# Patient Record
Sex: FEMALE | Race: WHITE | NOT HISPANIC OR LATINO | Employment: FULL TIME | URBAN - METROPOLITAN AREA
[De-identification: names, ages, dates, MRNs, and addresses within clinical notes are randomized per-mention and may not be internally consistent; named-entity substitution may affect disease eponyms.]

---

## 2017-01-20 ENCOUNTER — LAB VISIT (OUTPATIENT)
Dept: LAB | Facility: OTHER | Age: 32
End: 2017-01-20
Attending: OBSTETRICS & GYNECOLOGY
Payer: COMMERCIAL

## 2017-01-20 DIAGNOSIS — N91.5 OLIGOMENORRHEA: ICD-10-CM

## 2017-01-20 DIAGNOSIS — N97.0 PRIMARY ANOVULATORY INFERTILITY: ICD-10-CM

## 2017-01-20 LAB — PROGEST SERPL-MCNC: 0.5 NG/ML

## 2017-01-20 PROCEDURE — 84144 ASSAY OF PROGESTERONE: CPT

## 2017-01-20 PROCEDURE — 36415 COLL VENOUS BLD VENIPUNCTURE: CPT

## 2017-01-24 ENCOUNTER — TELEPHONE (OUTPATIENT)
Dept: OBSTETRICS AND GYNECOLOGY | Facility: CLINIC | Age: 32
End: 2017-01-24

## 2017-01-24 RX ORDER — CLOMIPHENE CITRATE 50 MG/1
100 TABLET ORAL DAILY
Qty: 10 TABLET | Refills: 0 | Status: SHIPPED | OUTPATIENT
Start: 2017-01-24 | End: 2017-02-14 | Stop reason: SDUPTHER

## 2017-01-24 NOTE — TELEPHONE ENCOUNTER
Left message for patient that blood work indicates she did not ovulate this month. Will increase clomid dose to 100 mg daily. New Rx sent in.

## 2017-01-25 ENCOUNTER — TELEPHONE (OUTPATIENT)
Dept: OBSTETRICS AND GYNECOLOGY | Facility: CLINIC | Age: 32
End: 2017-01-25

## 2017-01-25 NOTE — TELEPHONE ENCOUNTER
Dr. Mora patient calling about the monthly lab work she gets, can it be coded differently for insurance?please call patient back at  [phone]

## 2017-01-30 NOTE — TELEPHONE ENCOUNTER
Spoke with patient who was confused about timing of Provera and Clomid. Did not take Provera this month, just started Clomid. Instructed to let me know if no menses in 3 weeks, and if UPT negative at that point we will re-start cycle with Provera followed by Clomid.   Patient also had questions about coding of dx associated with Clomid. All questions answered and patient agrees to treatment plan.

## 2017-02-09 NOTE — TELEPHONE ENCOUNTER
Pt states she took the Clomid without getting her period. (1/26).  She started her period without the help of provera which is unlike her.  (advised she probably started her period because the clomid made her ovulate?)  Today is day 3 of her cycle.   She is asking for a rx of Provera too.      Is it too early for her to take Clomid again 2 weeks later?      Med order entered for Provera but NOT Clomid.     Allergies and pharmacy UTD

## 2017-02-09 NOTE — TELEPHONE ENCOUNTER
Dr Mora pt calling, pt is on clomid and started it to soon and was told to wait to see if her period comes.Pt states she has started her period should see refill the medication or just wait until next month. [phone]

## 2017-02-10 RX ORDER — MEDROXYPROGESTERONE ACETATE 10 MG/1
10 TABLET ORAL DAILY
Qty: 10 TABLET | Refills: 0 | Status: SHIPPED | OUTPATIENT
Start: 2017-02-10 | End: 2017-03-31 | Stop reason: SDUPTHER

## 2017-02-10 NOTE — TELEPHONE ENCOUNTER
Spoke with patient. Advised to go ahead with clomid treatment this cycle, so 2/7 should be considered day 1. That makes tomorrow (2/11) day 5, and patient will start Clomid then.

## 2017-02-10 NOTE — TELEPHONE ENCOUNTER
You sent this back to me. Was that by mistake. Did you call the patient or does Lindy need to call her back

## 2017-02-15 RX ORDER — CLOMIPHENE CITRATE 50 MG/1
100 TABLET ORAL DAILY
Qty: 10 TABLET | Refills: 0 | Status: SHIPPED | OUTPATIENT
Start: 2017-02-15 | End: 2017-02-20

## 2017-03-29 ENCOUNTER — LAB VISIT (OUTPATIENT)
Dept: LAB | Facility: OTHER | Age: 32
End: 2017-03-29
Attending: OBSTETRICS & GYNECOLOGY
Payer: COMMERCIAL

## 2017-03-29 DIAGNOSIS — N83.9 DISORDER OF OVULATION: Primary | ICD-10-CM

## 2017-03-29 DIAGNOSIS — N83.9 DISORDER OF OVULATION: ICD-10-CM

## 2017-03-29 PROCEDURE — 36415 COLL VENOUS BLD VENIPUNCTURE: CPT

## 2017-03-29 PROCEDURE — 84144 ASSAY OF PROGESTERONE: CPT

## 2017-03-30 LAB — PROGEST SERPL-MCNC: 9.4 NG/ML

## 2017-03-31 ENCOUNTER — TELEPHONE (OUTPATIENT)
Dept: OBSTETRICS AND GYNECOLOGY | Facility: CLINIC | Age: 32
End: 2017-03-31

## 2017-03-31 DIAGNOSIS — N91.5 OLIGOMENORRHEA: Primary | ICD-10-CM

## 2017-03-31 RX ORDER — MEDROXYPROGESTERONE ACETATE 10 MG/1
10 TABLET ORAL DAILY
Qty: 10 TABLET | Refills: 0 | Status: SHIPPED | OUTPATIENT
Start: 2017-03-31 | End: 2017-09-22

## 2017-03-31 RX ORDER — CLOMIPHENE CITRATE 50 MG/1
100 TABLET ORAL DAILY
Qty: 10 TABLET | Refills: 0 | Status: SHIPPED | OUTPATIENT
Start: 2017-03-31 | End: 2017-04-05

## 2017-03-31 NOTE — TELEPHONE ENCOUNTER
Discussed day 21 progesterone level indicating ovulation. Patient instructed to take UPT on day 30 if no menses. If no pregnancy this month will repeat cycle of provera and clomid. Refills sent in and day 21 progesterone ordered for next month if needed. If no pregnancy after that cycle patient instructed to make an appointment to discuss further options.

## 2017-04-26 ENCOUNTER — LAB VISIT (OUTPATIENT)
Dept: LAB | Facility: OTHER | Age: 32
End: 2017-04-26
Attending: OBSTETRICS & GYNECOLOGY
Payer: COMMERCIAL

## 2017-04-26 DIAGNOSIS — N91.5 OLIGOMENORRHEA: ICD-10-CM

## 2017-04-26 LAB — PROGEST SERPL-MCNC: 17.6 NG/ML

## 2017-04-26 PROCEDURE — 84144 ASSAY OF PROGESTERONE: CPT

## 2017-04-26 PROCEDURE — 36415 COLL VENOUS BLD VENIPUNCTURE: CPT

## 2017-04-28 ENCOUNTER — TELEPHONE (OUTPATIENT)
Dept: OBSTETRICS AND GYNECOLOGY | Facility: HOSPITAL | Age: 32
End: 2017-04-28

## 2017-04-28 NOTE — TELEPHONE ENCOUNTER
Called patient and reviewed that progesterone levels indicate ovulation. Instructed to take UPT on cycle day 30 if no period. If UPT negative will need to do further infertility work up with [facility] and [facility]. Patient instructed to call the office to discuss if UPT negative.

## 2017-05-08 ENCOUNTER — TELEPHONE (OUTPATIENT)
Dept: OBSTETRICS AND GYNECOLOGY | Facility: CLINIC | Age: 32
End: 2017-05-08

## 2017-05-08 NOTE — TELEPHONE ENCOUNTER
Dr. Mora's pt calling, Pt would like to speak to the nurse. Pt states that she took her 3rd month of ovulation medication. Pt states that she was told to call if she did not get pregnant after the 3rd month so that they can discuss other options. Pt's # [phone]

## 2017-05-08 NOTE — TELEPHONE ENCOUNTER
Pt did ovulate when clomid (100mg) according to her blood work but she did not get pregnant.  This is the 3rd month.  You wanted her to call if she was not pregnant this month to discuss other options.  She was asking if she needed to make an appt?

## 2017-05-09 NOTE — TELEPHONE ENCOUNTER
MD Lindy Prather RN        Caller: Unspecified (Yesterday, 10:04 AM)                     Mable has now been trying to get pregnant for almost a year without success. I would recommend at this point that she go see a Reproductive Endocrinologist and Infertility specialist because I know she and her  are really eager to conceive. Please let her know that I highly recommend Dr. Judith Harvey at [facility] [phone] or Dr. Yomi Banegas at the [facility] in [place] [phone].   I am more than happy to answer any questions she might have, so let me know if she wants me to give her a call!   Thank you

## 2017-06-13 LAB
ABO + RH BLD: NORMAL
C TRACH RRNA SPEC QL PROBE: NEGATIVE
CYSTIC FIBROSIS MUTATION PNL: NEGATIVE
HBV SURFACE AG SERPL QL IA: NEGATIVE
HIV 1+2 AB+HIV1 P24 AG SERPL QL IA: NEGATIVE
HIV-1 AND HIV-2 ANTIBODIES: NEGATIVE
INDIRECT COOMBS: NORMAL
N GONORRHOEAE, AMPLIFIED DNA: NEGATIVE
RPR: NORMAL
RUBELLA IMMUNE STATUS: NORMAL

## 2017-09-01 LAB — EXT MATERNIT21: NORMAL

## 2017-09-06 ENCOUNTER — TELEPHONE (OUTPATIENT)
Dept: OBSTETRICS AND GYNECOLOGY | Facility: CLINIC | Age: 32
End: 2017-09-06

## 2017-09-06 DIAGNOSIS — N91.2 AMENORRHEA: Primary | ICD-10-CM

## 2017-09-06 NOTE — TELEPHONE ENCOUNTER
Dr Mora's has initial ob appt 9-22-17 from fertility needs orders put into computer and linked.Thanks

## 2017-09-22 ENCOUNTER — PROCEDURE VISIT (OUTPATIENT)
Dept: OBSTETRICS AND GYNECOLOGY | Facility: CLINIC | Age: 32
End: 2017-09-22
Payer: COMMERCIAL

## 2017-09-22 ENCOUNTER — INITIAL PRENATAL (OUTPATIENT)
Dept: OBSTETRICS AND GYNECOLOGY | Facility: CLINIC | Age: 32
End: 2017-09-22
Payer: COMMERCIAL

## 2017-09-22 VITALS
BODY MASS INDEX: 24.51 KG/M2 | WEIGHT: 147.25 LBS | DIASTOLIC BLOOD PRESSURE: 64 MMHG | SYSTOLIC BLOOD PRESSURE: 118 MMHG

## 2017-09-22 DIAGNOSIS — Z3A.12 12 WEEKS GESTATION OF PREGNANCY: Primary | ICD-10-CM

## 2017-09-22 DIAGNOSIS — Z34.92 NORMAL PREGNANCY, SECOND TRIMESTER: ICD-10-CM

## 2017-09-22 DIAGNOSIS — N91.2 AMENORRHEA: ICD-10-CM

## 2017-09-22 PROCEDURE — 99999 PR PBB SHADOW E&M-EST. PATIENT-LVL II: CPT | Mod: PBBFAC,,, | Performed by: OBSTETRICS & GYNECOLOGY

## 2017-09-22 PROCEDURE — 0500F INITIAL PRENATAL CARE VISIT: CPT | Mod: S$GLB,,, | Performed by: OBSTETRICS & GYNECOLOGY

## 2017-09-22 PROCEDURE — 76801 OB US < 14 WKS SINGLE FETUS: CPT | Mod: S$GLB,,, | Performed by: OBSTETRICS & GYNECOLOGY

## 2017-10-16 NOTE — PROGRESS NOTES
15w6d without major complaints.   Patient sent to flu fair today for flu shot.   GC/CT collected today.  Anatomy scan ordered today.   RTC in 4 weeks for routine PNC.

## 2017-10-17 ENCOUNTER — IMMUNIZATION (OUTPATIENT)
Dept: OBSTETRICS AND GYNECOLOGY | Facility: CLINIC | Age: 32
End: 2017-10-17
Payer: COMMERCIAL

## 2017-10-17 ENCOUNTER — ROUTINE PRENATAL (OUTPATIENT)
Dept: OBSTETRICS AND GYNECOLOGY | Facility: CLINIC | Age: 32
End: 2017-10-17
Payer: COMMERCIAL

## 2017-10-17 VITALS
DIASTOLIC BLOOD PRESSURE: 74 MMHG | WEIGHT: 153.69 LBS | SYSTOLIC BLOOD PRESSURE: 106 MMHG | BODY MASS INDEX: 25.57 KG/M2

## 2017-10-17 DIAGNOSIS — Z34.92 NORMAL PREGNANCY, SECOND TRIMESTER: Primary | ICD-10-CM

## 2017-10-17 DIAGNOSIS — Z3A.15 15 WEEKS GESTATION OF PREGNANCY: ICD-10-CM

## 2017-10-17 PROCEDURE — 90686 IIV4 VACC NO PRSV 0.5 ML IM: CPT | Mod: S$GLB,,, | Performed by: OBSTETRICS & GYNECOLOGY

## 2017-10-17 PROCEDURE — 90471 IMMUNIZATION ADMIN: CPT | Mod: S$GLB,,, | Performed by: OBSTETRICS & GYNECOLOGY

## 2017-10-17 PROCEDURE — 87591 N.GONORRHOEAE DNA AMP PROB: CPT

## 2017-10-17 PROCEDURE — 0502F SUBSEQUENT PRENATAL CARE: CPT | Mod: S$GLB,,, | Performed by: OBSTETRICS & GYNECOLOGY

## 2017-10-17 PROCEDURE — 99999 PR PBB SHADOW E&M-EST. PATIENT-LVL II: CPT | Mod: PBBFAC,,, | Performed by: OBSTETRICS & GYNECOLOGY

## 2017-10-18 LAB
C TRACH DNA SPEC QL NAA+PROBE: NOT DETECTED
N GONORRHOEA DNA SPEC QL NAA+PROBE: NOT DETECTED

## 2017-11-08 ENCOUNTER — OFFICE VISIT (OUTPATIENT)
Dept: MATERNAL FETAL MEDICINE | Facility: CLINIC | Age: 32
End: 2017-11-08
Payer: COMMERCIAL

## 2017-11-08 DIAGNOSIS — Z36.89 ENCOUNTER FOR FETAL ANATOMIC SURVEY: ICD-10-CM

## 2017-11-08 DIAGNOSIS — Z34.92 NORMAL PREGNANCY, SECOND TRIMESTER: ICD-10-CM

## 2017-11-08 PROCEDURE — 76805 OB US >/= 14 WKS SNGL FETUS: CPT | Mod: S$GLB,,, | Performed by: OBSTETRICS & GYNECOLOGY

## 2017-11-08 PROCEDURE — 76817 TRANSVAGINAL US OBSTETRIC: CPT | Mod: S$GLB,,, | Performed by: OBSTETRICS & GYNECOLOGY

## 2017-11-08 PROCEDURE — 99499 UNLISTED E&M SERVICE: CPT | Mod: S$GLB,,, | Performed by: OBSTETRICS & GYNECOLOGY

## 2017-11-08 NOTE — PROGRESS NOTES
"OB Ultrasound Report (see PDF version under imaging tab)    Indication  ========    Fetal anatomy survey.    History  ======    General History  Other: Mat21 negative  Previous Outcomes   1    Method  ======    Transabdominal and transvaginal ultrasound examination.    Pregnancy  =========    Osborn pregnancy. Number of fetuses: 1.    Dating  ======    Cycle: regular cycle  GA by "stated dating" 19 w + 0 d  EMILIANO by "stated dating": 2018  Ultrasound examination on: 2017  GA by U/S based upon: AC, BPD, Femur, HC  GA by U/S 19 w + 2 d  EMILIANO by U/S: 2018  Assigned: Dating performed on 2017, based on the external assessment  Assigned GA 19 w + 0 d  Assigned EMILIANO: 2018    General Evaluation  ===============    Cardiac activity: present.  bpm.  Fetal movements: visualized.  Placenta:  Placental site: anterior, possibly low lying.  Umbilical cord: Cord vessels: 3 vessel cord. Cord insertion: placental insertion: normal.  Amniotic fluid: normal amount.    Fetal Biometry  ===========    Fetal Biometry  BPD 44.8 mm 19w 4d Hadlock  OFD 54.8 mm 19w 3d Robson  .5 mm 19w 0d Hadlock  .1 mm 19w 3d Hadlock  Femur 30.2 mm 19w 2d Hadlock  Cerebellum tr 19.6 mm 19w 4d Cyr  CM 3.5 mm  Nuchal fold 4.69 mm  Humerus 28.1 mm 19w 0d Robson   g  Calculated by: Hadlock (BPD-HC-AC-FL)  EFW (lb) 0 lb  EFW (oz) 10 oz  Cephalic index 0.82  HC / AC 1.14  FL / BPD 0.67  FL / AC 0.21   bpm  Head / Face / Neck   7.6 mm    Fetal Anatomy  ===========    Cranium: normal  Lateral ventricles: normal  Choroid plexus: normal  Midline falx: normal  Cavum septi pellucidi: normal  Cerebellum: normal  Cisterna magna: normal  Rt lateral ventricle: normal  Lt lateral ventricle: normal  Rt choroid plexus: normal  Lt choroid plexus: normal  Lips: normal  Profile: normal  Nose: normal  4-chamber view: normal  RVOT: suboptimal  LVOT: normal  Situs: normal  Cardiac position: normal  Cardiac " axis: normal  Cardiac size: normal  Cardiac rhythm: normal  Rt lung: normal  Lt lung: normal  Diaphragm: normal  Cord insertion: normal  Stomach: normal  Kidneys: normal  Bladder: normal  Abdom. wall: appears normal  Abdom. cavity: normal  Rt kidney: normal  Lt kidney: normal  Cervical spine: normal  Thoracic spine: normal  Lumbar spine: normal  Sacral spine: normal  Arms: normal  Legs: normal  Rt arm: normal  Lt arm: normal  Rt hand: present  Lt hand: present  Rt leg: normal  Lt leg: normal  Rt foot: normal  Lt foot: normal  Gender: female  Wants to know gender: yes    Maternal Structures  ===============    Uterus / Cervix  Uterus: Normal  Cervical length 37.4 mm  Ovaries / Tubes / Adnexa  Rt ovary: Visualized  Lt ovary: Visualized    Impression  =========    A gonzales living IUP is identified. Fetal size is appropriate for established dating. No fetal structural malformations are identified;  however, the anatomic survey is incomplete as noted above. The patient will be scheduled for a f/u exam to complete the anatomic  survey. Cervical length is normal by TA and TV scanning. The distal end of the placenta was located 2.5 cm from the internal cervical  os by TA scanning. Therefore, a TV scan was also performed to assess placental location. Unfortunately, a lower uterine segment  contraction prevented accurate determination of the distance between the caudad end of the placenta and the internal cervical os.  Will reassess when the patient returns in 4 weeks to complete the anatomic survey.

## 2017-11-29 NOTE — PROGRESS NOTES
22w2d without major complaints.    Anatomy U/S WNL but incomplete, repeat scheduled with MFM for 12/19.   RTC in 4 weeks for routine PNC.   1 hr GCT at next visit.

## 2017-12-01 ENCOUNTER — ROUTINE PRENATAL (OUTPATIENT)
Dept: OBSTETRICS AND GYNECOLOGY | Facility: CLINIC | Age: 32
End: 2017-12-01
Payer: COMMERCIAL

## 2017-12-01 VITALS
SYSTOLIC BLOOD PRESSURE: 108 MMHG | WEIGHT: 165.44 LBS | BODY MASS INDEX: 27.53 KG/M2 | DIASTOLIC BLOOD PRESSURE: 66 MMHG

## 2017-12-01 DIAGNOSIS — Z34.92 NORMAL PREGNANCY, SECOND TRIMESTER: Primary | ICD-10-CM

## 2017-12-01 DIAGNOSIS — Z3A.22 22 WEEKS GESTATION OF PREGNANCY: ICD-10-CM

## 2017-12-01 PROCEDURE — 0502F SUBSEQUENT PRENATAL CARE: CPT | Mod: S$GLB,,, | Performed by: OBSTETRICS & GYNECOLOGY

## 2017-12-01 PROCEDURE — 99999 PR PBB SHADOW E&M-EST. PATIENT-LVL II: CPT | Mod: PBBFAC,,, | Performed by: OBSTETRICS & GYNECOLOGY

## 2017-12-19 ENCOUNTER — OFFICE VISIT (OUTPATIENT)
Dept: MATERNAL FETAL MEDICINE | Facility: CLINIC | Age: 32
End: 2017-12-19
Payer: COMMERCIAL

## 2017-12-19 PROCEDURE — 99499 UNLISTED E&M SERVICE: CPT | Mod: S$GLB,,, | Performed by: OBSTETRICS & GYNECOLOGY

## 2017-12-19 PROCEDURE — 76816 OB US FOLLOW-UP PER FETUS: CPT | Mod: S$GLB,,, | Performed by: OBSTETRICS & GYNECOLOGY

## 2017-12-19 NOTE — PROGRESS NOTES
"OB Ultrasound Report (see PDF version under imaging tab)    Indication  ========    Follow-up evaluation of anatomy, RVOT; placenta location.    History  ======    General History  Other: MELVA FLETCHER  Previous Outcomes   1    Pregnancy History  ===============    Maternal Lab Tests  Test: Cell Free DNA Testing  Result: Materni T21 negative  Wants to know gender: yes    Method  ======    Transabdominal ultrasound examination. View: Good view.    Pregnancy  =========    Osborn pregnancy. Number of fetuses: 1.    Dating  ======    Cycle: regular cycle  GA by "stated dating" 24 w + 6 d  EMILIANO by "stated dating": 2018  Ultrasound examination on: 2017  GA by U/S based upon: AC, BPD, Femur, HC  GA by U/S 24 w + 5 d  EMILIANO by U/S: 2018  Assigned: Dating performed on 2017, based on the external assessment  Assigned GA 24 w + 6 d  Assigned EMILIANO: 2018    General Evaluation  ===============    Cardiac activity: present.  bpm.  Fetal movements: visualized.  Presentation: breech.  Placenta:  Placental site: anterior.  Umbilical cord: Cord vessels: 3 vessel cord. Cord insertion: placental insertion: normal.  Amniotic fluid: Amount of AF: normal amount. MVP 6.1 cm.    Fetal Biometry  ===========    Fetal Biometry  BPD 59.8 mm 24w 3d Hadlock  OFD 76.1 mm 25w 0d Robson  .7 mm 24w 0d Hadlock  .6 mm 26w 0d Hadlock  Femur 44.5 mm 24w 5d Hadlock   g 53% Pal  Calculated by: Hadlock (BPD-HC-AC-FL)  EFW (lb) 1 lb  EFW (oz) 12 oz  Cephalic index 0.79  HC / AC 1.02  FL / BPD 0.74  FL / AC 0.21  MVP 6.1 cm   bpm  Head / Face / Neck   5.1 mm    Fetal Anatomy  ===========    Cranium: normal  Lateral ventricles: normal  Cerebellum: normal  Cisterna magna: normal  Lips: normal  Profile: normal  Nose: normal  RVOT: normal  LVOT: normal  4-chamber view: 4-chamber normal, septum " normal  Stomach: normal  Kidneys: normal  Bladder: normal  Genitals: normal  Gender: female  Wants to know gender: yes    Impression  =========    A follow up fetal anatomical ultrasound was completed today.  The fetal anatomic survey was completed today, and no fetal structural abnormalities were noted. Interval fetal growth has been  normal, and the AFV is normal. Placental location is normal without evidence of previa.  F/U as clinically indicated.

## 2017-12-20 NOTE — PROGRESS NOTES
26w2d without major complaints.   1 hr GCT and CBC today.   Tdap at next visit.   RTC in 4 weeks for routine PNC.

## 2017-12-29 ENCOUNTER — LAB VISIT (OUTPATIENT)
Dept: LAB | Facility: OTHER | Age: 32
End: 2017-12-29
Attending: OBSTETRICS & GYNECOLOGY
Payer: COMMERCIAL

## 2017-12-29 ENCOUNTER — ROUTINE PRENATAL (OUTPATIENT)
Dept: OBSTETRICS AND GYNECOLOGY | Facility: CLINIC | Age: 32
End: 2017-12-29
Payer: COMMERCIAL

## 2017-12-29 ENCOUNTER — TELEPHONE (OUTPATIENT)
Dept: OBSTETRICS AND GYNECOLOGY | Facility: CLINIC | Age: 32
End: 2017-12-29

## 2017-12-29 VITALS
DIASTOLIC BLOOD PRESSURE: 64 MMHG | SYSTOLIC BLOOD PRESSURE: 106 MMHG | BODY MASS INDEX: 28.58 KG/M2 | WEIGHT: 171.75 LBS

## 2017-12-29 DIAGNOSIS — Z3A.26 26 WEEKS GESTATION OF PREGNANCY: ICD-10-CM

## 2017-12-29 DIAGNOSIS — Z34.92 NORMAL PREGNANCY, SECOND TRIMESTER: ICD-10-CM

## 2017-12-29 DIAGNOSIS — Z34.92 NORMAL PREGNANCY, SECOND TRIMESTER: Primary | ICD-10-CM

## 2017-12-29 LAB
BASOPHILS # BLD AUTO: 0.02 K/UL
BASOPHILS NFR BLD: 0.2 %
DIFFERENTIAL METHOD: ABNORMAL
EOSINOPHIL # BLD AUTO: 0.1 K/UL
EOSINOPHIL NFR BLD: 0.4 %
ERYTHROCYTE [DISTWIDTH] IN BLOOD BY AUTOMATED COUNT: 13.4 %
GLUCOSE SERPL-MCNC: 120 MG/DL
HCT VFR BLD AUTO: 35.6 %
HGB BLD-MCNC: 11.8 G/DL
LYMPHOCYTES # BLD AUTO: 2.3 K/UL
LYMPHOCYTES NFR BLD: 19 %
MCH RBC QN AUTO: 30.7 PG
MCHC RBC AUTO-ENTMCNC: 33.1 G/DL
MCV RBC AUTO: 93 FL
MONOCYTES # BLD AUTO: 0.8 K/UL
MONOCYTES NFR BLD: 6.6 %
NEUTROPHILS # BLD AUTO: 8.9 K/UL
NEUTROPHILS NFR BLD: 73.2 %
PLATELET # BLD AUTO: 327 K/UL
PMV BLD AUTO: 9.8 FL
RBC # BLD AUTO: 3.84 M/UL
WBC # BLD AUTO: 12.19 K/UL

## 2017-12-29 PROCEDURE — 0502F SUBSEQUENT PRENATAL CARE: CPT | Mod: S$GLB,,, | Performed by: OBSTETRICS & GYNECOLOGY

## 2017-12-29 PROCEDURE — 82950 GLUCOSE TEST: CPT

## 2017-12-29 PROCEDURE — 36415 COLL VENOUS BLD VENIPUNCTURE: CPT

## 2017-12-29 PROCEDURE — 85025 COMPLETE CBC W/AUTO DIFF WBC: CPT

## 2017-12-29 PROCEDURE — 99999 PR PBB SHADOW E&M-EST. PATIENT-LVL II: CPT | Mod: PBBFAC,,, | Performed by: OBSTETRICS & GYNECOLOGY

## 2017-12-29 NOTE — TELEPHONE ENCOUNTER
Pt calling to speak to someone about a bill she received. Pt aware Stephanie is out until next Tuesday.

## 2017-12-29 NOTE — TELEPHONE ENCOUNTER
----- Message from Adenike Mora MD sent at 12/29/2017  2:14 PM CST -----  Please call Mable and let her know that her glucose screen and blood count are normal.

## 2018-01-26 ENCOUNTER — ROUTINE PRENATAL (OUTPATIENT)
Dept: OBSTETRICS AND GYNECOLOGY | Facility: CLINIC | Age: 33
End: 2018-01-26
Payer: COMMERCIAL

## 2018-01-26 ENCOUNTER — CLINICAL SUPPORT (OUTPATIENT)
Dept: OBSTETRICS AND GYNECOLOGY | Facility: CLINIC | Age: 33
End: 2018-01-26
Payer: COMMERCIAL

## 2018-01-26 VITALS
DIASTOLIC BLOOD PRESSURE: 76 MMHG | BODY MASS INDEX: 29.44 KG/M2 | SYSTOLIC BLOOD PRESSURE: 112 MMHG | WEIGHT: 176.94 LBS

## 2018-01-26 DIAGNOSIS — Z34.93 NORMAL PREGNANCY, THIRD TRIMESTER: ICD-10-CM

## 2018-01-26 DIAGNOSIS — Z3A.30 30 WEEKS GESTATION OF PREGNANCY: ICD-10-CM

## 2018-01-26 DIAGNOSIS — Z23 NEED FOR TDAP VACCINATION: Primary | ICD-10-CM

## 2018-01-26 PROCEDURE — 99999 PR PBB SHADOW E&M-EST. PATIENT-LVL III: CPT | Mod: PBBFAC,,, | Performed by: OBSTETRICS & GYNECOLOGY

## 2018-01-26 PROCEDURE — 90471 IMMUNIZATION ADMIN: CPT | Mod: S$GLB,,, | Performed by: OBSTETRICS & GYNECOLOGY

## 2018-01-26 PROCEDURE — 90715 TDAP VACCINE 7 YRS/> IM: CPT | Mod: S$GLB,,, | Performed by: OBSTETRICS & GYNECOLOGY

## 2018-01-26 PROCEDURE — 0502F SUBSEQUENT PRENATAL CARE: CPT | Mod: S$GLB,,, | Performed by: OBSTETRICS & GYNECOLOGY

## 2018-01-26 PROCEDURE — 99999 PR PBB SHADOW E&M-EST. PATIENT-LVL II: CPT | Mod: PBBFAC,,,

## 2018-01-26 NOTE — PROGRESS NOTES
Ordering Physician: Dr. Mora    Verbal Order    Patient here to receive tdap to right deltoid. Tolerated well, no reaction noted. Instructed to wait for 15 minutes after admin.    Pre pain scale: none  Post pain scale:none

## 2018-02-14 NOTE — PROGRESS NOTES
33w2d without major complaints.   Post partum contraception discussed and patient would like more time to consider, but may do progesterone only OCPs.   RTC in 2 weeks for routine PNC.

## 2018-02-16 ENCOUNTER — ROUTINE PRENATAL (OUTPATIENT)
Dept: OBSTETRICS AND GYNECOLOGY | Facility: CLINIC | Age: 33
End: 2018-02-16
Payer: COMMERCIAL

## 2018-02-16 VITALS
BODY MASS INDEX: 29.72 KG/M2 | SYSTOLIC BLOOD PRESSURE: 130 MMHG | DIASTOLIC BLOOD PRESSURE: 90 MMHG | WEIGHT: 178.56 LBS

## 2018-02-16 DIAGNOSIS — Z34.93 NORMAL PREGNANCY, THIRD TRIMESTER: Primary | ICD-10-CM

## 2018-02-16 DIAGNOSIS — Z3A.33 33 WEEKS GESTATION OF PREGNANCY: ICD-10-CM

## 2018-02-16 PROCEDURE — 0502F SUBSEQUENT PRENATAL CARE: CPT | Mod: S$GLB,,, | Performed by: OBSTETRICS & GYNECOLOGY

## 2018-02-16 PROCEDURE — 99999 PR PBB SHADOW E&M-EST. PATIENT-LVL III: CPT | Mod: PBBFAC,,, | Performed by: OBSTETRICS & GYNECOLOGY

## 2018-02-22 ENCOUNTER — OFFICE VISIT (OUTPATIENT)
Dept: PEDIATRICS | Facility: CLINIC | Age: 33
End: 2018-02-22
Payer: COMMERCIAL

## 2018-02-22 PROCEDURE — 99999 PR PBB SHADOW E&M-EST. PATIENT-LVL I: CPT | Mod: PBBFAC,,, | Performed by: PEDIATRICS

## 2018-02-22 PROCEDURE — 99499 UNLISTED E&M SERVICE: CPT | Mod: S$GLB,,, | Performed by: PEDIATRICS

## 2018-03-02 ENCOUNTER — LAB VISIT (OUTPATIENT)
Dept: LAB | Facility: OTHER | Age: 33
End: 2018-03-02
Attending: OBSTETRICS & GYNECOLOGY
Payer: COMMERCIAL

## 2018-03-02 ENCOUNTER — ROUTINE PRENATAL (OUTPATIENT)
Dept: OBSTETRICS AND GYNECOLOGY | Facility: CLINIC | Age: 33
End: 2018-03-02
Payer: COMMERCIAL

## 2018-03-02 VITALS
DIASTOLIC BLOOD PRESSURE: 96 MMHG | BODY MASS INDEX: 31.18 KG/M2 | SYSTOLIC BLOOD PRESSURE: 154 MMHG | WEIGHT: 187.38 LBS

## 2018-03-02 DIAGNOSIS — Z34.93 NORMAL PREGNANCY, THIRD TRIMESTER: Primary | ICD-10-CM

## 2018-03-02 DIAGNOSIS — O16.3 HYPERTENSION DURING PREGNANCY IN THIRD TRIMESTER, UNSPECIFIED HYPERTENSION IN PREGNANCY TYPE: ICD-10-CM

## 2018-03-02 DIAGNOSIS — Z3A.35 35 WEEKS GESTATION OF PREGNANCY: ICD-10-CM

## 2018-03-02 LAB
ALBUMIN SERPL BCP-MCNC: 2.9 G/DL
ALP SERPL-CCNC: 149 U/L
ALT SERPL W/O P-5'-P-CCNC: 15 U/L
ANION GAP SERPL CALC-SCNC: 9 MMOL/L
AST SERPL-CCNC: 20 U/L
BILIRUB SERPL-MCNC: 0.2 MG/DL
BUN SERPL-MCNC: 10 MG/DL
CALCIUM SERPL-MCNC: 9 MG/DL
CHLORIDE SERPL-SCNC: 106 MMOL/L
CO2 SERPL-SCNC: 24 MMOL/L
CREAT SERPL-MCNC: 0.7 MG/DL
CREAT UR-MCNC: 132 MG/DL
EST. GFR  (AFRICAN AMERICAN): >60 ML/MIN/1.73 M^2
EST. GFR  (NON AFRICAN AMERICAN): >60 ML/MIN/1.73 M^2
GLUCOSE SERPL-MCNC: 91 MG/DL
POTASSIUM SERPL-SCNC: 4.1 MMOL/L
PROT SERPL-MCNC: 6.6 G/DL
PROT UR-MCNC: 25 MG/DL
PROT/CREAT RATIO, UR: 0.19
SODIUM SERPL-SCNC: 139 MMOL/L

## 2018-03-02 PROCEDURE — 99999 PR PBB SHADOW E&M-EST. PATIENT-LVL III: CPT | Mod: PBBFAC,,, | Performed by: OBSTETRICS & GYNECOLOGY

## 2018-03-02 PROCEDURE — 0502F SUBSEQUENT PRENATAL CARE: CPT | Mod: S$GLB,,, | Performed by: OBSTETRICS & GYNECOLOGY

## 2018-03-02 PROCEDURE — 86592 SYPHILIS TEST NON-TREP QUAL: CPT

## 2018-03-02 PROCEDURE — 80053 COMPREHEN METABOLIC PANEL: CPT

## 2018-03-02 PROCEDURE — 36415 COLL VENOUS BLD VENIPUNCTURE: CPT

## 2018-03-02 PROCEDURE — 87081 CULTURE SCREEN ONLY: CPT

## 2018-03-02 PROCEDURE — 86703 HIV-1/HIV-2 1 RESULT ANTBDY: CPT

## 2018-03-02 PROCEDURE — 84156 ASSAY OF PROTEIN URINE: CPT

## 2018-03-02 NOTE — PROGRESS NOTES
35w2d without major complaints.   GBS collected and HIV/RPR drawn today. CBC, CMP, and urine spot prot/cr ratio ordered stat given new onset HTN.  Labor precautions reviewed.   Consents for delivery and transfusion signed today.   RTC in early next week for BPP and PNV.

## 2018-03-04 LAB — BACTERIA SPEC AEROBE CULT: NORMAL

## 2018-03-05 ENCOUNTER — TELEPHONE (OUTPATIENT)
Dept: OBSTETRICS AND GYNECOLOGY | Facility: CLINIC | Age: 33
End: 2018-03-05

## 2018-03-05 LAB
HIV 1+2 AB+HIV1 P24 AG SERPL QL IA: NEGATIVE
RPR SER QL: NORMAL

## 2018-03-05 NOTE — TELEPHONE ENCOUNTER
Called patient to let her know that her pre-e labs were WNL. Left message reminding her to come to Tuesday appointment.

## 2018-03-06 ENCOUNTER — ROUTINE PRENATAL (OUTPATIENT)
Dept: OBSTETRICS AND GYNECOLOGY | Facility: CLINIC | Age: 33
End: 2018-03-06
Payer: COMMERCIAL

## 2018-03-06 ENCOUNTER — TELEPHONE (OUTPATIENT)
Dept: OBSTETRICS AND GYNECOLOGY | Facility: CLINIC | Age: 33
End: 2018-03-06

## 2018-03-06 ENCOUNTER — PROCEDURE VISIT (OUTPATIENT)
Dept: OBSTETRICS AND GYNECOLOGY | Facility: CLINIC | Age: 33
End: 2018-03-06
Payer: COMMERCIAL

## 2018-03-06 VITALS
DIASTOLIC BLOOD PRESSURE: 86 MMHG | WEIGHT: 184.94 LBS | BODY MASS INDEX: 30.78 KG/M2 | SYSTOLIC BLOOD PRESSURE: 144 MMHG

## 2018-03-06 DIAGNOSIS — O16.3 HYPERTENSION DURING PREGNANCY IN THIRD TRIMESTER, UNSPECIFIED HYPERTENSION IN PREGNANCY TYPE: ICD-10-CM

## 2018-03-06 DIAGNOSIS — Z3A.35 35 WEEKS GESTATION OF PREGNANCY: ICD-10-CM

## 2018-03-06 DIAGNOSIS — O16.3 HYPERTENSION DURING PREGNANCY IN THIRD TRIMESTER, UNSPECIFIED HYPERTENSION IN PREGNANCY TYPE: Primary | ICD-10-CM

## 2018-03-06 PROCEDURE — 99499 UNLISTED E&M SERVICE: CPT | Mod: S$GLB,,, | Performed by: OBSTETRICS & GYNECOLOGY

## 2018-03-06 PROCEDURE — 76819 FETAL BIOPHYS PROFIL W/O NST: CPT | Mod: S$GLB,,, | Performed by: OBSTETRICS & GYNECOLOGY

## 2018-03-06 PROCEDURE — 99999 PR PBB SHADOW E&M-EST. PATIENT-LVL II: CPT | Mod: PBBFAC,,, | Performed by: OBSTETRICS & GYNECOLOGY

## 2018-03-06 NOTE — TELEPHONE ENCOUNTER
----- Message from Adenike Mora MD sent at 3/6/2018 12:56 PM CST -----      Procedure: answer Y where needed       Induction     Pitocin_____     Cytotec Y     Balloon____     Other______    Indication (elective/other) Non-Elective, due to gestational HTN    Date desired 3/15  Time desired Any time in the evening.    Due Date 18    Cervical exam- (inductions only)   Dilation: 0   Effacement: 50   Station: -3   Consistency: firm   Position: posterior    MCKNIGHT SCORE 1

## 2018-03-06 NOTE — TELEPHONE ENCOUNTER
Scheduled Cytotec induction for 3/15 at 2000.      I will confirm with pt after I hear back from you.

## 2018-03-07 ENCOUNTER — TELEPHONE (OUTPATIENT)
Dept: OBSTETRICS AND GYNECOLOGY | Facility: CLINIC | Age: 33
End: 2018-03-07

## 2018-03-07 NOTE — TELEPHONE ENCOUNTER
Dr Mora pt calling, ob 35wks was told that the doctor would give her a call back today regarding test results and she is waiting on her call. Pt # [phone]

## 2018-03-08 ENCOUNTER — TELEPHONE (OUTPATIENT)
Dept: OBSTETRICS AND GYNECOLOGY | Facility: CLINIC | Age: 33
End: 2018-03-08

## 2018-03-08 NOTE — TELEPHONE ENCOUNTER
Spoke to pt letting her know that Dr. Mora was off on thursdays and would hear from her tomorrow. Pt voiced understanding.

## 2018-03-10 ENCOUNTER — HOSPITAL ENCOUNTER (INPATIENT)
Facility: OTHER | Age: 33
LOS: 2 days | Discharge: HOME | End: 2018-03-12
Attending: OBSTETRICS & GYNECOLOGY | Admitting: OBSTETRICS & GYNECOLOGY
Payer: COMMERCIAL

## 2018-03-10 ENCOUNTER — ANESTHESIA (OUTPATIENT)
Dept: OBSTETRICS AND GYNECOLOGY | Facility: OTHER | Age: 33
End: 2018-03-10
Payer: COMMERCIAL

## 2018-03-10 ENCOUNTER — ANESTHESIA EVENT (OUTPATIENT)
Dept: OBSTETRICS AND GYNECOLOGY | Facility: OTHER | Age: 33
End: 2018-03-10
Payer: COMMERCIAL

## 2018-03-10 DIAGNOSIS — Z3A.36 36 WEEKS GESTATION OF PREGNANCY: ICD-10-CM

## 2018-03-10 DIAGNOSIS — O47.9 UTERINE CONTRACTIONS DURING PREGNANCY: ICD-10-CM

## 2018-03-10 PROBLEM — O13.9 GESTATIONAL HYPERTENSION AFFECTING FIRST PREGNANCY: Status: ACTIVE | Noted: 2018-03-10

## 2018-03-10 LAB
ABO + RH BLD: NORMAL
ALBUMIN SERPL BCP-MCNC: 2.7 G/DL
ALP SERPL-CCNC: 141 U/L
ALT SERPL W/O P-5'-P-CCNC: 12 U/L
ANION GAP SERPL CALC-SCNC: 10 MMOL/L
AST SERPL-CCNC: 17 U/L
BASOPHILS # BLD AUTO: 0.02 K/UL
BASOPHILS NFR BLD: 0.1 %
BILIRUB SERPL-MCNC: 0.3 MG/DL
BLD GP AB SCN CELLS X3 SERPL QL: NORMAL
BUN SERPL-MCNC: 8 MG/DL
CALCIUM SERPL-MCNC: 8.9 MG/DL
CHLORIDE SERPL-SCNC: 106 MMOL/L
CO2 SERPL-SCNC: 22 MMOL/L
CREAT SERPL-MCNC: 0.7 MG/DL
DIFFERENTIAL METHOD: ABNORMAL
EOSINOPHIL # BLD AUTO: 0 K/UL
EOSINOPHIL NFR BLD: 0.1 %
ERYTHROCYTE [DISTWIDTH] IN BLOOD BY AUTOMATED COUNT: 13 %
EST. GFR  (AFRICAN AMERICAN): >60 ML/MIN/1.73 M^2
EST. GFR  (NON AFRICAN AMERICAN): >60 ML/MIN/1.73 M^2
GLUCOSE SERPL-MCNC: 96 MG/DL
HCT VFR BLD AUTO: 36.3 %
HGB BLD-MCNC: 12.2 G/DL
LYMPHOCYTES # BLD AUTO: 2 K/UL
LYMPHOCYTES NFR BLD: 13.1 %
MCH RBC QN AUTO: 29.9 PG
MCHC RBC AUTO-ENTMCNC: 33.6 G/DL
MCV RBC AUTO: 89 FL
MONOCYTES # BLD AUTO: 0.8 K/UL
MONOCYTES NFR BLD: 5.5 %
NEUTROPHILS # BLD AUTO: 12.3 K/UL
NEUTROPHILS NFR BLD: 80.8 %
PLATELET # BLD AUTO: 298 K/UL
PMV BLD AUTO: 11.1 FL
POTASSIUM SERPL-SCNC: 4.1 MMOL/L
PROT SERPL-MCNC: 6.2 G/DL
RBC # BLD AUTO: 4.08 M/UL
SODIUM SERPL-SCNC: 138 MMOL/L
WBC # BLD AUTO: 15.19 K/UL

## 2018-03-10 PROCEDURE — 25000003 PHARM REV CODE 250: Performed by: ANESTHESIOLOGY

## 2018-03-10 PROCEDURE — 63600175 PHARM REV CODE 636 W HCPCS: Performed by: ANESTHESIOLOGY

## 2018-03-10 PROCEDURE — 80053 COMPREHEN METABOLIC PANEL: CPT

## 2018-03-10 PROCEDURE — 11000001 HC ACUTE MED/SURG PRIVATE ROOM

## 2018-03-10 PROCEDURE — 4A0HXCZ MEASUREMENT OF PRODUCTS OF CONCEPTION, CARDIAC RATE, EXTERNAL APPROACH: ICD-10-PCS | Performed by: OBSTETRICS & GYNECOLOGY

## 2018-03-10 PROCEDURE — 25000003 PHARM REV CODE 250: Performed by: OBSTETRICS & GYNECOLOGY

## 2018-03-10 PROCEDURE — 85025 COMPLETE CBC W/AUTO DIFF WBC: CPT

## 2018-03-10 PROCEDURE — 51702 INSERT TEMP BLADDER CATH: CPT

## 2018-03-10 PROCEDURE — 86901 BLOOD TYPING SEROLOGIC RH(D): CPT

## 2018-03-10 PROCEDURE — 72200004 HC VAGINAL DELIVERY LEVEL I

## 2018-03-10 PROCEDURE — 0KQM0ZZ REPAIR PERINEUM MUSCLE, OPEN APPROACH: ICD-10-PCS | Performed by: OBSTETRICS & GYNECOLOGY

## 2018-03-10 PROCEDURE — 59025 FETAL NON-STRESS TEST: CPT

## 2018-03-10 PROCEDURE — 10907ZC DRAINAGE OF AMNIOTIC FLUID, THERAPEUTIC FROM PRODUCTS OF CONCEPTION, VIA NATURAL OR ARTIFICIAL OPENING: ICD-10-PCS | Performed by: OBSTETRICS & GYNECOLOGY

## 2018-03-10 PROCEDURE — 99285 EMERGENCY DEPT VISIT HI MDM: CPT | Mod: 25

## 2018-03-10 PROCEDURE — 99283 EMERGENCY DEPT VISIT LOW MDM: CPT | Mod: 25,,, | Performed by: OBSTETRICS & GYNECOLOGY

## 2018-03-10 PROCEDURE — 27200710 HC EPIDURAL INFUSION PUMP SET: Performed by: ANESTHESIOLOGY

## 2018-03-10 PROCEDURE — 27800517 HC TRAY,EPIDURAL-CONTINUOUS: Performed by: ANESTHESIOLOGY

## 2018-03-10 PROCEDURE — 62326 NJX INTERLAMINAR LMBR/SAC: CPT | Performed by: ANESTHESIOLOGY

## 2018-03-10 PROCEDURE — 59400 OBSTETRICAL CARE: CPT | Mod: QY,,, | Performed by: ANESTHESIOLOGY

## 2018-03-10 PROCEDURE — 59025 FETAL NON-STRESS TEST: CPT | Mod: 26,,, | Performed by: OBSTETRICS & GYNECOLOGY

## 2018-03-10 PROCEDURE — 63600175 PHARM REV CODE 636 W HCPCS: Performed by: OBSTETRICS & GYNECOLOGY

## 2018-03-10 RX ORDER — ONDANSETRON 2 MG/ML
4 INJECTION INTRAMUSCULAR; INTRAVENOUS EVERY 6 HOURS PRN
Status: DISCONTINUED | OUTPATIENT
Start: 2018-03-10 | End: 2018-03-12 | Stop reason: HOSPADM

## 2018-03-10 RX ORDER — BUPIVACAINE HYDROCHLORIDE 2.5 MG/ML
INJECTION, SOLUTION EPIDURAL; INFILTRATION; INTRACAUDAL
Status: DISCONTINUED | OUTPATIENT
Start: 2018-03-10 | End: 2018-03-10

## 2018-03-10 RX ORDER — OXYTOCIN/RINGER'S LACTATE 20/1000 ML
41.65 PLASTIC BAG, INJECTION (ML) INTRAVENOUS CONTINUOUS
Status: ACTIVE | OUTPATIENT
Start: 2018-03-10 | End: 2018-03-11

## 2018-03-10 RX ORDER — ONDANSETRON 8 MG/1
8 TABLET, ORALLY DISINTEGRATING ORAL EVERY 8 HOURS PRN
Status: DISCONTINUED | OUTPATIENT
Start: 2018-03-10 | End: 2018-03-10 | Stop reason: SDUPTHER

## 2018-03-10 RX ORDER — CARBOPROST TROMETHAMINE 250 UG/ML
250 INJECTION, SOLUTION INTRAMUSCULAR
Status: DISCONTINUED | OUTPATIENT
Start: 2018-03-10 | End: 2018-03-12 | Stop reason: HOSPADM

## 2018-03-10 RX ORDER — PRENATAL WITH FERROUS FUM AND FOLIC ACID 3080; 920; 120; 400; 22; 1.84; 3; 20; 10; 1; 12; 200; 27; 25; 2 [IU]/1; [IU]/1; MG/1; [IU]/1; MG/1; MG/1; MG/1; MG/1; MG/1; MG/1; UG/1; MG/1; MG/1; MG/1; MG/1
1 TABLET ORAL DAILY
Status: DISCONTINUED | OUTPATIENT
Start: 2018-03-11 | End: 2018-03-12 | Stop reason: HOSPADM

## 2018-03-10 RX ORDER — OXYCODONE AND ACETAMINOPHEN 5; 325 MG/1; MG/1
1 TABLET ORAL EVERY 4 HOURS PRN
Status: DISCONTINUED | OUTPATIENT
Start: 2018-03-10 | End: 2018-03-12 | Stop reason: HOSPADM

## 2018-03-10 RX ORDER — MISOPROSTOL 200 UG/1
600 TABLET ORAL
Status: DISCONTINUED | OUTPATIENT
Start: 2018-03-10 | End: 2018-03-12 | Stop reason: HOSPADM

## 2018-03-10 RX ORDER — HYDROCORTISONE 25 MG/G
CREAM TOPICAL 3 TIMES DAILY PRN
Status: DISCONTINUED | OUTPATIENT
Start: 2018-03-10 | End: 2018-03-12 | Stop reason: HOSPADM

## 2018-03-10 RX ORDER — FENTANYL CITRATE 50 UG/ML
INJECTION, SOLUTION INTRAMUSCULAR; INTRAVENOUS
Status: DISCONTINUED | OUTPATIENT
Start: 2018-03-10 | End: 2018-03-10

## 2018-03-10 RX ORDER — DIPHENHYDRAMINE HYDROCHLORIDE 50 MG/ML
25 INJECTION INTRAMUSCULAR; INTRAVENOUS EVERY 4 HOURS PRN
Status: DISCONTINUED | OUTPATIENT
Start: 2018-03-10 | End: 2018-03-12 | Stop reason: HOSPADM

## 2018-03-10 RX ORDER — SODIUM CHLORIDE, SODIUM LACTATE, POTASSIUM CHLORIDE, CALCIUM CHLORIDE 600; 310; 30; 20 MG/100ML; MG/100ML; MG/100ML; MG/100ML
INJECTION, SOLUTION INTRAVENOUS CONTINUOUS
Status: DISCONTINUED | OUTPATIENT
Start: 2018-03-10 | End: 2018-03-12 | Stop reason: HOSPADM

## 2018-03-10 RX ORDER — SODIUM CITRATE AND CITRIC ACID MONOHYDRATE 334; 500 MG/5ML; MG/5ML
30 SOLUTION ORAL ONCE
Status: DISCONTINUED | OUTPATIENT
Start: 2018-03-10 | End: 2018-03-12 | Stop reason: HOSPADM

## 2018-03-10 RX ORDER — MISOPROSTOL 200 UG/1
200 TABLET ORAL EVERY 6 HOURS
Status: DISCONTINUED | OUTPATIENT
Start: 2018-03-11 | End: 2018-03-11

## 2018-03-10 RX ORDER — DOCUSATE SODIUM 100 MG/1
200 CAPSULE, LIQUID FILLED ORAL 2 TIMES DAILY PRN
Status: DISCONTINUED | OUTPATIENT
Start: 2018-03-10 | End: 2018-03-12 | Stop reason: HOSPADM

## 2018-03-10 RX ORDER — FAMOTIDINE 10 MG/ML
20 INJECTION INTRAVENOUS ONCE
Status: DISCONTINUED | OUTPATIENT
Start: 2018-03-10 | End: 2018-03-12 | Stop reason: HOSPADM

## 2018-03-10 RX ORDER — LIDOCAINE HYDROCHLORIDE AND EPINEPHRINE 15; 5 MG/ML; UG/ML
INJECTION, SOLUTION EPIDURAL
Status: DISCONTINUED | OUTPATIENT
Start: 2018-03-10 | End: 2018-03-10

## 2018-03-10 RX ORDER — DIPHENHYDRAMINE HCL 25 MG
25 CAPSULE ORAL EVERY 4 HOURS PRN
Status: DISCONTINUED | OUTPATIENT
Start: 2018-03-10 | End: 2018-03-12 | Stop reason: HOSPADM

## 2018-03-10 RX ORDER — CLONAZEPAM 0.5 MG/1
1 TABLET ORAL 2 TIMES DAILY
Status: DISCONTINUED | OUTPATIENT
Start: 2018-03-10 | End: 2018-03-12 | Stop reason: HOSPADM

## 2018-03-10 RX ORDER — ACETAMINOPHEN 325 MG/1
650 TABLET ORAL EVERY 6 HOURS PRN
Status: DISCONTINUED | OUTPATIENT
Start: 2018-03-10 | End: 2018-03-12 | Stop reason: HOSPADM

## 2018-03-10 RX ORDER — IBUPROFEN 600 MG/1
600 TABLET ORAL EVERY 6 HOURS
Status: DISCONTINUED | OUTPATIENT
Start: 2018-03-10 | End: 2018-03-12 | Stop reason: HOSPADM

## 2018-03-10 RX ORDER — METHYLERGONOVINE MALEATE 0.2 MG/ML
200 INJECTION INTRAVENOUS
Status: DISCONTINUED | OUTPATIENT
Start: 2018-03-10 | End: 2018-03-12 | Stop reason: HOSPADM

## 2018-03-10 RX ORDER — FENTANYL/BUPIVACAINE/NS/PF 2MCG/ML-.1
PLASTIC BAG, INJECTION (ML) INJECTION CONTINUOUS
Status: DISCONTINUED | OUTPATIENT
Start: 2018-03-10 | End: 2018-03-12 | Stop reason: HOSPADM

## 2018-03-10 RX ORDER — ESCITALOPRAM OXALATE 5 MG/1
5 TABLET ORAL DAILY
Status: DISCONTINUED | OUTPATIENT
Start: 2018-03-11 | End: 2018-03-12 | Stop reason: HOSPADM

## 2018-03-10 RX ORDER — SODIUM CHLORIDE 9 MG/ML
INJECTION, SOLUTION INTRAVENOUS
Status: DISCONTINUED | OUTPATIENT
Start: 2018-03-10 | End: 2018-03-12 | Stop reason: HOSPADM

## 2018-03-10 RX ORDER — OXYTOCIN/RINGER'S LACTATE 20/1000 ML
41.65 PLASTIC BAG, INJECTION (ML) INTRAVENOUS CONTINUOUS
Status: DISPENSED | OUTPATIENT
Start: 2018-03-10 | End: 2018-03-11

## 2018-03-10 RX ORDER — FENTANYL/BUPIVACAINE/NS/PF 2MCG/ML-.1
PLASTIC BAG, INJECTION (ML) INJECTION CONTINUOUS PRN
Status: DISCONTINUED | OUTPATIENT
Start: 2018-03-10 | End: 2018-03-10

## 2018-03-10 RX ORDER — OXYCODONE AND ACETAMINOPHEN 10; 325 MG/1; MG/1
1 TABLET ORAL EVERY 4 HOURS PRN
Status: DISCONTINUED | OUTPATIENT
Start: 2018-03-10 | End: 2018-03-12 | Stop reason: HOSPADM

## 2018-03-10 RX ORDER — ONDANSETRON 8 MG/1
8 TABLET, ORALLY DISINTEGRATING ORAL EVERY 8 HOURS PRN
Status: DISCONTINUED | OUTPATIENT
Start: 2018-03-10 | End: 2018-03-12 | Stop reason: HOSPADM

## 2018-03-10 RX ADMIN — BUPIVACAINE HYDROCHLORIDE 1 ML: 2.5 INJECTION, SOLUTION EPIDURAL; INFILTRATION; INTRACAUDAL; PERINEURAL at 03:03

## 2018-03-10 RX ADMIN — Medication 10 ML/HR: at 03:03

## 2018-03-10 RX ADMIN — FENTANYL CITRATE 10 MCG: 50 INJECTION, SOLUTION INTRAMUSCULAR; INTRAVENOUS at 03:03

## 2018-03-10 RX ADMIN — SODIUM CHLORIDE, POTASSIUM CHLORIDE, SODIUM LACTATE AND CALCIUM CHLORIDE 1000 ML: 600; 310; 30; 20 INJECTION, SOLUTION INTRAVENOUS at 03:03

## 2018-03-10 RX ADMIN — IBUPROFEN 600 MG: 600 TABLET, FILM COATED ORAL at 07:03

## 2018-03-10 RX ADMIN — CLONAZEPAM 1 MG: 0.5 TABLET ORAL at 09:03

## 2018-03-10 RX ADMIN — SODIUM CHLORIDE, POTASSIUM CHLORIDE, SODIUM LACTATE AND CALCIUM CHLORIDE: 600; 310; 30; 20 INJECTION, SOLUTION INTRAVENOUS at 03:03

## 2018-03-10 RX ADMIN — MISOPROSTOL 600 MCG: 200 TABLET ORAL at 06:03

## 2018-03-10 RX ADMIN — LIDOCAINE HYDROCHLORIDE,EPINEPHRINE BITARTRATE 3 ML: 15; .005 INJECTION, SOLUTION EPIDURAL; INFILTRATION; INTRACAUDAL; PERINEURAL at 03:03

## 2018-03-10 RX ADMIN — Medication 41.65 MILLI-UNITS/MIN: at 05:03

## 2018-03-10 RX ADMIN — SODIUM CHLORIDE, POTASSIUM CHLORIDE, SODIUM LACTATE AND CALCIUM CHLORIDE 1000 ML: 600; 310; 30; 20 INJECTION, SOLUTION INTRAVENOUS at 02:03
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
## 2018-03-10 NOTE — ASSESSMENT & PLAN NOTE
Will admit to labor and delivery.  Consents previously signed with Dr. Mora in the office.  Will augment labor with pitocin as needed

## 2018-03-10 NOTE — ANESTHESIA PROCEDURE NOTES
CSE    Patient location during procedure: OB  Start time: 3/10/2018 3:07 PM  Timeout: 3/10/2018 3:07 PM  End time: 3/10/2018 3:14 PM  Staffing  Anesthesiologist: EVRA FLORES  Resident/CRNA: JARVIS JUDGE  Performed: resident/CRNA   Preanesthetic Checklist  Completed: patient identified, site marked, surgical consent, pre-op evaluation, timeout performed, IV checked, risks and benefits discussed and monitors and equipment checked  CSE  Patient position: sitting  Prep: ChloraPrep  Patient monitoring: continuous pulse ox and frequent blood pressure checks  Approach: midline  Spinal Needle  Needle type: pencil-tip   Needle gauge: 25 G  Needle length: 5 in  Epidural Needle  Injection technique: RONDA saline  Needle type: Tuohy   Needle gauge: 17 G  Needle length: 3.5 in  Needle insertion depth: 5.5 cm  Location: L3-4  Needle localization: anatomical landmarks  Catheter  Catheter type: Vivoxid  Catheter size: 19 G  Catheter at skin depth: 10 cm  Test dose: lidocaine 1.5% with Epi 1-to-200,000  Additional Documentation: incremental injection, negative aspiration for CSF, negative aspiration for heme, no paresthesia on injection and negative test dose  Assessment  Intrathecal Medications:  Bolus administered: 1 mL of 0.25 bupivacaine  administered: 10 mcg of  fentanyl

## 2018-03-10 NOTE — ANESTHESIA PREPROCEDURE EVALUATION
03/10/2018  Mable Philip is a 32 y.o., female.    Mable Philip is a 32 y.o. female F at 36w3d presents complaining of contractions since early this morning and vaginal bleeding beginning at approximately 4am. The bleeding has increased and she has gone through three pads since the bleeding started (about 8 hours ago). The contraction pain is in her back and she denies abdominal pain or tightening. The pains occur every 2 minutes and last for 30 seconds. She also notes vaginal leakage of fluids for the last 2 weeks. She notes good fetal movement.      This IUP is complicated by elevated blood pressures with negative pre-eclampsia workup.    No anes history.     OB History    Para Term  AB Living   1             SAB TAB Ectopic Multiple Live Births                  # Outcome Date GA Lbr Flavio/2nd Weight Sex Delivery Anes PTL Lv   1 Current                   Wt Readings from Last 1 Encounters:   18 0943 83.9 kg (184 lb 15.5 oz)       BP Readings from Last 3 Encounters:   03/10/18 (!) 146/93   18 (!) 144/86   18 (!) 154/96       Patient Active Problem List   Diagnosis    Oligomenorrhea    Uterine contractions during pregnancy    36 weeks gestation of pregnancy    Gestational hypertension affecting first pregnancy       Past Surgical History:   Procedure Laterality Date    DENTAL IMPLANTS  2011    WISDOM TOOTH EXTRACTION         Social History     Social History    Marital status:      Spouse name: N/A    Number of children: N/A    Years of education: N/A     Occupational History    Not on file.     Social History Main Topics    Smoking status: Never Smoker    Smokeless tobacco: Never Used    Alcohol use Yes      Comment: Socially    Drug use: No    Sexual activity: Yes     Partners: Male     Birth control/ protection: None       Comment: :  Patel     Other Topics Concern    Not on file     Social History Narrative    No narrative on file         Chemistry        Component Value Date/Time     03/02/2018 1423    K 4.1 03/02/2018 1423     03/02/2018 1423    CO2 24 03/02/2018 1423    BUN 10 03/02/2018 1423    CREATININE 0.7 03/02/2018 1423    GLU 91 03/02/2018 1423        Component Value Date/Time    CALCIUM 9.0 03/02/2018 1423    ALKPHOS 149 (H) 03/02/2018 1423    AST 20 03/02/2018 1423    ALT 15 03/02/2018 1423    BILITOT 0.2 03/02/2018 1423    ESTGFRAFRICA >60 03/02/2018 1423    EGFRNONAA >60 03/02/2018 1423            Lab Results   Component Value Date    WBC 12.19 12/29/2017    HGB 11.8 (L) 12/29/2017    HCT 35.6 (L) 12/29/2017    MCV 93 12/29/2017     12/29/2017       No results for input(s): PT, INR, PROTIME, APTT in the last 72 hours.      Anesthesia Evaluation    I have reviewed the Patient Summary Reports.    I have reviewed the Nursing Notes.   I have reviewed the Medications.     Review of Systems  Anesthesia Hx:  No previous Anesthesia  Neg history of prior surgery. Denies Family Hx of Anesthesia complications.    Social:  No Alcohol Use, Non-Smoker    Hematology/Oncology:     Oncology Normal    -- Anemia:   EENT/Dental:EENT/Dental Normal   Cardiovascular:   Exercise tolerance: good Hypertension    Pulmonary:  Pulmonary Normal    Renal/:  Renal/ Normal     Hepatic/GI:  Hepatic/GI Normal    Musculoskeletal:  Musculoskeletal Normal    Neurological:  Neurology Normal    Endocrine:  Endocrine Normal    Dermatological:  Skin Normal    Psych:   Psychiatric History          Physical Exam  General:  Well nourished    Airway/Jaw/Neck:  Airway Findings: Mouth Opening: Normal Tongue: Normal  General Airway Assessment: Adult  Mallampati: III  Improves to II with phonation.  TM Distance: Normal, at least 6 cm  Jaw/Neck Findings:  Neck ROM: Normal ROM      Dental:  Dental Findings:   Chest/Lungs:  Chest/Lungs  Findings: Clear to auscultation, Normal Respiratory Rate     Heart/Vascular:  Heart Findings: Rate: Normal  Rhythm: Regular Rhythm  Sounds: Normal  Heart murmur: negative Vascular Findings: Normal    Abdomen:  Abdomen Findings:     Musculoskeletal:  Musculoskeletal Findings: Normal   Skin:  Skin Findings: Normal    Mental Status:  Mental Status Findings: Normal        Anesthesia Plan  Type of Anesthesia, risks & benefits discussed:  Anesthesia Type:  CSE, epidural, general, spinal  Patient's Preference:   Intra-op Monitoring Plan: standard ASA monitors  Intra-op Monitoring Plan Comments:   Post Op Pain Control Plan: multimodal analgesia  Post Op Pain Control Plan Comments:   Induction:   IV and rapid sequence  Beta Blocker:  Patient is not currently on a Beta-Blocker (No further documentation required).       Informed Consent: Patient understands risks and agrees with Anesthesia plan.  Questions answered. Anesthesia consent signed with patient.  ASA Score: 2     Day of Surgery Review of History & Physical:    H&P update referred to the surgeon.         Ready For Surgery From Anesthesia Perspective.
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
## 2018-03-10 NOTE — HPI
Mable Phliip is a 32 y.o. F at 36w3d presents complaining of contractions since early this morning and vaginal bleeding beginning at approximately 4am. The bleeding has increased and she has gone through three pads since the bleeding started (about 8 hours ago). The contraction pain is in her back and she denies abdominal pain or tightening. The pains occur every 2 minutes and last for 30 seconds. She also notes vaginal leakage of fluids for the last 2 weeks. She notes good fetal movement.      This IUP is complicated by elevated blood pressures with negative pre-eclampsia workup.

## 2018-03-10 NOTE — Clinical Note
I certify that Inpatient services for greater than or equal to 2 midnights are medically necessary:: Yes   Transfer To (Destination): [facility] LABOR AND DELIVERY [[id number]]   Diagnosis: Uterine contractions during pregnancy [[id number]]   Admitting Provider:: CHERYL ANDERSON [[id number]]   Future Attending Provider: MELVA FLETCHER [[id number]]   Bed Type Preference: Standard [6]   Reason for IP Medical Treatment  (Clinical interventions that can only be accomplished in the IP setting? ) :: Management of labor and delivery, postpartum  care   Estimated Length of Stay:: 2 midnights   Plans for Post-Acute care--if anticipated (pick the single best option):: A. No post acute care anticipated at this time   Special Needs:: No Special Needs [1]
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
## 2018-03-10 NOTE — HOSPITAL COURSE
3/10 Pt admitted to L+D due to labor  PPD 1: patient is without unusual complaints.  PPD 2: ready for DC

## 2018-03-10 NOTE — ED TRIAGE NOTES
Pt presents with a /co of vaginal bleeding and loss of mucous plug.  Pt to FIDE 3.  Dr. Roth notified of arrival.

## 2018-03-11 PROBLEM — Z3A.36 36 WEEKS GESTATION OF PREGNANCY: Status: RESOLVED | Noted: 2018-03-10 | Resolved: 2018-03-11

## 2018-03-11 LAB
BASOPHILS # BLD AUTO: 0.02 K/UL
BASOPHILS NFR BLD: 0.1 %
DIFFERENTIAL METHOD: ABNORMAL
EOSINOPHIL # BLD AUTO: 0 K/UL
EOSINOPHIL NFR BLD: 0.1 %
ERYTHROCYTE [DISTWIDTH] IN BLOOD BY AUTOMATED COUNT: 13.1 %
HCT VFR BLD AUTO: 27.8 %
HGB BLD-MCNC: 9.2 G/DL
LYMPHOCYTES # BLD AUTO: 2.6 K/UL
LYMPHOCYTES NFR BLD: 18.2 %
MCH RBC QN AUTO: 29.5 PG
MCHC RBC AUTO-ENTMCNC: 33.1 G/DL
MCV RBC AUTO: 89 FL
MONOCYTES # BLD AUTO: 1.4 K/UL
MONOCYTES NFR BLD: 9.7 %
NEUTROPHILS # BLD AUTO: 10.3 K/UL
NEUTROPHILS NFR BLD: 71.9 %
PLATELET # BLD AUTO: 244 K/UL
PMV BLD AUTO: 11.4 FL
RBC # BLD AUTO: 3.12 M/UL
WBC # BLD AUTO: 14.32 K/UL

## 2018-03-11 PROCEDURE — 99024 POSTOP FOLLOW-UP VISIT: CPT | Mod: ,,, | Performed by: OBSTETRICS & GYNECOLOGY

## 2018-03-11 PROCEDURE — 25000003 PHARM REV CODE 250: Performed by: OBSTETRICS & GYNECOLOGY

## 2018-03-11 PROCEDURE — 11000001 HC ACUTE MED/SURG PRIVATE ROOM

## 2018-03-11 PROCEDURE — 85025 COMPLETE CBC W/AUTO DIFF WBC: CPT

## 2018-03-11 PROCEDURE — 36415 COLL VENOUS BLD VENIPUNCTURE: CPT

## 2018-03-11 RX ORDER — IBUPROFEN 600 MG/1
600 TABLET ORAL EVERY 6 HOURS
Qty: 60 TABLET | Refills: 1 | Status: SHIPPED | OUTPATIENT
Start: 2018-03-11 | End: 2018-04-20

## 2018-03-11 RX ORDER — ESCITALOPRAM OXALATE 5 MG/1
5 TABLET ORAL DAILY
Qty: 30 TABLET | Refills: 11 | Status: SHIPPED | OUTPATIENT
Start: 2018-03-12 | End: 2019-03-12

## 2018-03-11 RX ORDER — OXYCODONE AND ACETAMINOPHEN 5; 325 MG/1; MG/1
1 TABLET ORAL EVERY 4 HOURS PRN
Qty: 15 TABLET | Refills: 0 | Status: SHIPPED | OUTPATIENT
Start: 2018-03-11 | End: 2018-04-20

## 2018-03-11 RX ADMIN — MISOPROSTOL 200 MCG: 200 TABLET ORAL at 06:03

## 2018-03-11 RX ADMIN — CLONAZEPAM 1 MG: 0.5 TABLET ORAL at 09:03

## 2018-03-11 RX ADMIN — IBUPROFEN 600 MG: 600 TABLET, FILM COATED ORAL at 06:03

## 2018-03-11 RX ADMIN — IBUPROFEN 600 MG: 600 TABLET, FILM COATED ORAL at 12:03

## 2018-03-11 RX ADMIN — MISOPROSTOL 200 MCG: 200 TABLET ORAL at 12:03

## 2018-03-11 RX ADMIN — ESCITALOPRAM 5 MG: 5 TABLET, FILM COATED ORAL at 09:03

## 2018-03-11 RX ADMIN — PRENATAL VIT W/ FE FUMARATE-FA TAB 27-0.8 MG 1 TABLET: 27-0.8 TAB at 09:03

## 2018-03-11 NOTE — LACTATION NOTE
Lactation rounds done.  Bedside RN helping pt to latch baby.  Noted with wide gape, active nursing and some sudible swallows.  Further assistance provided.  Reviewed the basics of breastfeeding and discussed breast compression.  Will monitor baby's feeding,  If baby continuous to in efficiently nursing which baby was doing earlier today (pt5 expressing colostrum and spoon-feeding to baby), will plan to start pt on pumping.   number on the board.      03/11/18 1249   Maternal Infant Assessment   Breast Shape pendulous   Breast Density soft   Areola elastic   Nipple(s) everted   Infant Assessment   Sucking Reflex present   Rooting Reflex present   Swallow Reflex present   LATCH Score   Latch 1-->repeated attempts, holds nipple in mouth, stimulate to suck   Audible Swallowing 1-->a few with stimulation   Type Of Nipple 2-->everted (after stimulation)   Comfort (Breast/Nipple) 2-->soft/nontender   Hold (Positioning) 1-->minimal assist, teach one side: mother does other, staff holds   Score (less than 7 for 2/more consecutive times, consult Lactation Consultant) 7       Number Scale   Presence of Pain non-verbal indicators present   Factors that Aggravate Pain positioning   Factors that Relieve Pain repositioning   Maternal Infant Feeding   Maternal Emotional State assist needed   Infant Positioning cross-cradle;ventral   Signs of Milk Transfer audible swallow   Time Spent (min) 15-30 min   Nipple Shape After Feeding, Left round   Nipple Shape After Feeding, Right round   Breastfeeding Education adequate infant intake;importance of skin-to-skin contact;milk expression, hand   Feeding Infant   Effective Latch During Feeding yes   Skin-to-Skin Contact During Feeding yes   Lactation Referrals   Lactation Consult Breastfeeding assessment;Initial assessment   Lactation Interventions   Breastfeeding Assistance assisted with positioning;support offered;feeding session observed   Maternal Breastfeeding Support lactation  counseling provided   Latch Promotion suck stimulated with colostrum drop

## 2018-03-11 NOTE — PROGRESS NOTES
Called due to heavy bleeding and blood clots.   On exam, the pad that has been in place for last 20 minutes has about 50cc of blood.   Bimanual reveals large clot of about 50cc, removed  cytotec 600mcg placed per rectum  Fundus firm, NT, below umbilicus  Will start cytotec series    Patricia Roth DO

## 2018-03-11 NOTE — ANESTHESIA POSTPROCEDURE EVALUATION
"Anesthesia Post Evaluation    Patient: Mable Philip    Procedure(s) Performed: * No procedures listed *    Final Anesthesia Type: CSE  Patient location during evaluation: labor & delivery  Patient participation: Yes- Able to Participate  Level of consciousness: awake and alert and oriented  Post-procedure vital signs: reviewed and stable  Pain management: adequate  Airway patency: patent  PONV status at discharge: No PONV  Anesthetic complications: no      Cardiovascular status: blood pressure returned to baseline  Respiratory status: unassisted and spontaneous ventilation  Hydration status: euvolemic  Follow-up not needed.        Visit Vitals  /86 (BP Location: Left arm, Patient Position: Sitting)   Pulse 105   Temp 37.1 °C (98.7 °F) (Oral)   Resp 18   Ht 5' 5" (1.651 m)   Wt 84.8 kg (187 lb)   LMP 11/12/2016 (Exact Date)   SpO2 97%   Breastfeeding? Unknown   BMI 31.12 kg/m²       Pain/Nova Score: Presence of Pain: complains of pain/discomfort (3/10/2018 12:30 PM)  Pain Rating Prior to Med Admin: 3 (3/11/2018  6:00 AM)  Pain Rating Post Med Admin: 0 (3/11/2018  1:10 AM)      "

## 2018-03-11 NOTE — SUBJECTIVE & OBJECTIVE
Hospital course: 3/10 Pt admitted to L+D due to labor  PPD 1: patient is without unusual complaints.      She is doing well this morning. She is tolerating a regular diet without nausea or vomiting. She is voiding spontaneously. She is ambulating. She has passed flatus, and has not a BM. Vaginal bleeding is mild. She denies fever or chills. Abdominal pain is mild and controlled with oral medications. She is breastfeeding.     Objective:     Vital Signs (Most Recent):  Temp: 98.7 °F (37.1 °C) (03/11/18 0815)  Pulse: 105 (03/11/18 0815)  Resp: 18 (03/11/18 0815)  BP: 131/86 (03/11/18 0815)  SpO2: 97 % (03/11/18 0815) Vital Signs (24h Range):  Temp:  [97.2 °F (36.2 °C)-99.7 °F (37.6 °C)] 98.7 °F (37.1 °C)  Pulse:  [] 105  Resp:  [16-20] 18  SpO2:  [97 %-100 %] 97 %  BP: (131-159)/() 131/86     Weight: 84.8 kg (187 lb)  Body mass index is 31.12 kg/m².      Intake/Output Summary (Last 24 hours) at 03/11/18 1008  Last data filed at 03/10/18 1920   Gross per 24 hour   Intake             2000 ml   Output             1152 ml   Net              848 ml       Significant Labs:  Lab Results   Component Value Date    GROUPTRH O POS 03/10/2018    HEPBSAG Negative 06/13/2017    STREPBCULT No Group B Streptococcus isolated 03/02/2018       Recent Labs  Lab 03/10/18  1345   HGB 12.2   HCT 36.3*       I have personallly reviewed all pertinent lab results from the last 24 hours.    Physical Exam:   Constitutional: She is oriented to person, place, and time. She appears well-developed and well-nourished. No distress.       Cardiovascular: Normal rate.     Pulmonary/Chest: No respiratory distress.        Abdominal: Soft. She exhibits no distension. There is no tenderness. There is no rebound and no guarding.     Genitourinary:   Genitourinary Comments: Fundus firm, Nt, below umbilicus           Musculoskeletal: She exhibits edema. She exhibits no tenderness.   1+ edema of lower extremities       Neurological: She is alert and  oriented to person, place, and time.    Skin: Skin is warm and dry.    Psychiatric: She has a normal mood and affect.

## 2018-03-11 NOTE — PROGRESS NOTES
[facility]  Obstetrics  Postpartum Progress Note    Patient Name: Mable Philip  MRN: [id number]  Admission Date: 3/10/2018  Hospital Length of Stay: 1 days  Attending Physician: Adenike Mora, *  Primary Care Provider: Primary Doctor No    Subjective:     Principal Problem:Vaginal delivery    Hospital course: 3/10 Pt admitted to L+D due to labor  PPD 1: patient is without unusual complaints.      She is doing well this morning. She is tolerating a regular diet without nausea or vomiting. She is voiding spontaneously. She is ambulating. She has passed flatus, and has not a BM. Vaginal bleeding is mild. She denies fever or chills. Abdominal pain is mild and controlled with oral medications. She is breastfeeding.     Objective:     Vital Signs (Most Recent):  Temp: 98.7 °F (37.1 °C) (03/11/18 0815)  Pulse: 105 (03/11/18 0815)  Resp: 18 (03/11/18 0815)  BP: 131/86 (03/11/18 0815)  SpO2: 97 % (03/11/18 0815) Vital Signs (24h Range):  Temp:  [97.2 °F (36.2 °C)-99.7 °F (37.6 °C)] 98.7 °F (37.1 °C)  Pulse:  [] 105  Resp:  [16-20] 18  SpO2:  [97 %-100 %] 97 %  BP: (131-159)/() 131/86     Weight: 84.8 kg (187 lb)  Body mass index is 31.12 kg/m².      Intake/Output Summary (Last 24 hours) at 03/11/18 1008  Last data filed at 03/10/18 1920   Gross per 24 hour   Intake             2000 ml   Output             1152 ml   Net              848 ml       Significant Labs:  Lab Results   Component Value Date    GROUPTRH O POS 03/10/2018    HEPBSAG Negative 06/13/2017    STREPBCULT No Group B Streptococcus isolated 03/02/2018       Recent Labs  Lab 03/10/18  1345   HGB 12.2   HCT 36.3*       I have personallly reviewed all pertinent lab results from the last 24 hours.    Physical Exam:   Constitutional: She is oriented to person, place, and time. She appears well-developed and well-nourished. No distress.       Cardiovascular: Normal rate.     Pulmonary/Chest: No respiratory distress.         Abdominal: Soft. She exhibits no distension. There is no tenderness. There is no rebound and no guarding.     Genitourinary:   Genitourinary Comments: Fundus firm, Nt, below umbilicus           Musculoskeletal: She exhibits edema. She exhibits no tenderness.   1+ edema of lower extremities       Neurological: She is alert and oriented to person, place, and time.    Skin: Skin is warm and dry.    Psychiatric: She has a normal mood and affect.       Assessment/Plan:     32 y.o. female  for:    * Vaginal delivery    Routine PP care        Gestational hypertension affecting first pregnancy    Mild range BP's.  Will repeat CMP to ensure no changes            Disposition: As patient meets milestones, will plan to discharge tomorrow.    Sarah Bartlett MD  Obstetrics  [facility]

## 2018-03-12 VITALS
RESPIRATION RATE: 18 BRPM | TEMPERATURE: 98 F | HEART RATE: 113 BPM | BODY MASS INDEX: 31.16 KG/M2 | OXYGEN SATURATION: 98 % | WEIGHT: 187 LBS | SYSTOLIC BLOOD PRESSURE: 131 MMHG | DIASTOLIC BLOOD PRESSURE: 80 MMHG | HEIGHT: 65 IN

## 2018-03-12 PROCEDURE — 25000003 PHARM REV CODE 250: Performed by: OBSTETRICS & GYNECOLOGY

## 2018-03-12 PROCEDURE — 99024 POSTOP FOLLOW-UP VISIT: CPT | Mod: ,,, | Performed by: OBSTETRICS & GYNECOLOGY

## 2018-03-12 PROCEDURE — 59400 OBSTETRICAL CARE: CPT | Mod: ,,, | Performed by: OBSTETRICS & GYNECOLOGY

## 2018-03-12 RX ORDER — BREAST PUMP
1 EACH MISCELLANEOUS
Qty: 1 DEVICE | Refills: 0 | Status: SHIPPED | OUTPATIENT
Start: 2018-03-12 | End: 2020-05-12

## 2018-03-12 RX ADMIN — PRENATAL VIT W/ FE FUMARATE-FA TAB 27-0.8 MG 1 TABLET: 27-0.8 TAB at 08:03

## 2018-03-12 RX ADMIN — CLONAZEPAM 1 MG: 0.5 TABLET ORAL at 08:03

## 2018-03-12 RX ADMIN — ESCITALOPRAM 5 MG: 5 TABLET, FILM COATED ORAL at 08:03

## 2018-03-12 RX ADMIN — IBUPROFEN 600 MG: 600 TABLET, FILM COATED ORAL at 12:03

## 2018-03-12 RX ADMIN — DOCUSATE SODIUM 200 MG: 100 CAPSULE, LIQUID FILLED ORAL at 08:03

## 2018-03-12 RX ADMIN — IBUPROFEN 600 MG: 600 TABLET, FILM COATED ORAL at 11:03

## 2018-03-12 RX ADMIN — IBUPROFEN 600 MG: 600 TABLET, FILM COATED ORAL at 05:03

## 2018-03-12 NOTE — L&D DELIVERY NOTE
[facility]  Vaginal Delivery   Operative Note    SUMMARY     Normal spontaneous vaginal delivery of live infant, was placed on mothers abdomen for skin to skin and bulb suctioning performed.  Infant delivered position OA over perineum.  Nuchal cord: No.    Manual delivery of placenta and IV pitocin given noting good uterine tone.  2nd degree laceration noted and repaired in normal fashion with 3-0 vicryl suture and bilateral fernanda-urethral lacerations also repaired each with a single figure-of-eight stitch using 3-0 vicryl suture.  Patient tolerated delivery well. Sponge needle and lap counted correctly x2.    Indications: Vaginal delivery  Pregnancy complicated by:   Patient Active Problem List   Diagnosis    Oligomenorrhea    Gestational hypertension affecting first pregnancy    Vaginal delivery     Admitting GA: 36w3d    Delivery Information for  Colette Philip    Birth information:  YOB: 2020   Time of birth: 4:47 PM   Sex: female   Head Delivery Date/Time: 3/10/2018  4:47 PM   Delivery type: Vaginal, Spontaneous Delivery   Gestational Age: 36w3d    Delivery Providers    Delivering clinician:  Adenike Mora MD   Provider Role    Eduarda Cobian, RN Registered Nurse    Miguelina Smyth RN Registered Nurse    [facility]    Gladys Giles RN Registered Nurse             Measurements    Weight:  2560 g  Length:  45.7 cm  Head circumference:  33 cm  Chest circumference:  31.8 cm          Assessment    Living status:  Living  Apgars:     1 Minute:   5 Minute:   10 Minute:   15 Minute:   20 Minute:     Skin Color:   1  1  1  1     Heart Rate:   2  2  2  2     Reflex Irritability:   2  1  2  2     Muscle Tone:   1  2  1  2     Respiratory Effort:   1  1  1  2     Total:   7  7  7  9                    Assisted Delivery Details:    Forceps attempted?:  No  Vacuum extractor attempted?:  No         Shoulder Dystocia    Shoulder dystocia  present?:  No           Presentation and Position    Presentation:  Vertex           Interventions/Resuscitation    Method:  Bulb Suctioning, Tactile Stimulation       Cord    Vessels:  3 vessels  Complications:  None  Delayed Cord Clamping?:  No  Cord Blood Disposition:  Sent with Baby  Gases Sent?:  No  Stem Cell Collection (by MD):  No       Placenta    Date and time:  3/10/2018  3:58 PM  Removal:  Manual removal  Appearance:  Intact  Placenta disposition:  discarded           Labor Events:       labor: Yes     Labor Onset Date/Time: 03/10/2018 03:00     Dilation Complete Date/Time: 03/10/2018 15:54     Start Pushing Date/Time: 03/10/2018 16:15     Rupture Date/Time:              Rupture type:           Fluid Amount:        Fluid Color:        Fluid Odor:        Membrane Status (PeriCalm): ARM (Artificial Rupture)      Rupture Date/Time (PeriCalm): 03/10/2018 16:12:00      Fluid Amount (PeriCalm): Moderate      Fluid Color (PeriCalm): Clear       steroids: None     Antibiotics given for GBS: No     Induction: none     Indications for induction:        Augmentation:       Indications for augmentation:       Labor complications: None     Additional complications:          Cervical ripening:                     Delivery:      Episiotomy:       Indication for Episiotomy:       Perineal Lacerations: 2nd Repaired:  Yes   Periurethral Laceration: bilateral Repaired: Yes   Labial Laceration:   Repaired:     Sulcus Laceration:   Repaired:     Vaginal Laceration: No Repaired:     Cervical Laceration: No Repaired:     Repair suture:       Repair # of packets: 1     Vaginal delivery QBL (mL): 502      QBL (mL): 0     Combined Blood Loss (mL): 0     Vaginal Sweep Performed: Yes     Surgicount Correct:         Other providers:       Anesthesia    Method:  Epidural

## 2018-03-12 NOTE — PROGRESS NOTES
[facility]  Obstetrics  Postpartum Progress Note    Patient Name: Mable Philip  MRN: [id number]  Admission Date: 3/10/2018  Hospital Length of Stay: 2 days  Attending Physician: Adenike Mora, *  Primary Care Provider: Primary Doctor No    Subjective:     Principal Problem:Vaginal delivery    Hospital course: 3/10 Pt admitted to L+D due to labor  PPD 1: patient is without unusual complaints.  PPD 2: ready for DC    Interval History:     She is doing well this morning. She is tolerating a regular diet without nausea or vomiting. She is voiding spontaneously. She is ambulating. She has passed flatus, and has a BM. Vaginal bleeding is mild. She denies fever or chills. Abdominal pain is mild and controlled with oral medications. She is breastfeeding.   Objective:     Vital Signs (Most Recent):  Temp: 97.6 °F (36.4 °C) (03/12/18 0900)  Pulse: (!) 113 (03/12/18 0900)  Resp: 18 (03/12/18 0900)  BP: 131/80 (03/12/18 0900)  SpO2: 98 % (03/12/18 0900) Vital Signs (24h Range):  Temp:  [97.6 °F (36.4 °C)-98.9 °F (37.2 °C)] 97.6 °F (36.4 °C)  Pulse:  [] 113  Resp:  [18] 18  SpO2:  [97 %-98 %] 98 %  BP: (131-145)/(65-82) 131/80     Weight: 84.8 kg (187 lb)  Body mass index is 31.12 kg/m².      Intake/Output Summary (Last 24 hours) at 03/12/18 1037  Last data filed at 03/11/18 1300   Gross per 24 hour   Intake                0 ml   Output              300 ml   Net             -300 ml       Significant Labs:  Lab Results   Component Value Date    GROUPTRH O POS 03/10/2018    HEPBSAG Negative 06/13/2017    STREPBCULT No Group B Streptococcus isolated 03/02/2018       Recent Labs  Lab 03/11/18  0500   HGB 9.2*   HCT 27.8*       I have personallly reviewed all pertinent lab results from the last 24 hours.    Physical Exam:   Constitutional: She is oriented to person, place, and time. She appears well-developed and well-nourished. No distress.    HENT:   Head: Normocephalic and atraumatic.       Cardiovascular: Normal rate, regular rhythm, normal heart sounds and intact distal pulses.     Pulmonary/Chest: Effort normal. No respiratory distress.        Abdominal: Soft. Bowel sounds are normal. She exhibits no mass.     Genitourinary: Uterus normal. No vaginal discharge found.   Genitourinary Comments: Firm fundus below umbilicus           Musculoskeletal: Normal range of motion and moves all extremeties.       Neurological: She is alert and oriented to person, place, and time.    Skin: Skin is warm. She is not diaphoretic.    Psychiatric: She has a normal mood and affect. Her behavior is normal. Judgment and thought content normal.       Assessment/Plan:     32 y.o. female  for:    * Vaginal delivery    Routine PP care        Gestational hypertension affecting first pregnancy    Asx. Bps mild range. No indication for med on DC.  BP check in 1 week with primary OB            Disposition: As patient meets milestones, will plan to discharge today.    Keerthi Arriola MD  Obstetrics  [facility]

## 2018-03-12 NOTE — PLAN OF CARE
Problem: Patient Care Overview  Goal: Plan of Care Review  Outcome: Outcome(s) achieved Date Met: 03/12/18  LACTATION NOTE:  Mother will nurse on cue till content; will hold baby skin-to-skin as much as possible; will ensure baby achieves deep, asymmetric latch and will observe for signs of milk transfer; will watch attachment video at website provided; will hand express and spoon feed baby EBM until milk volume increases and baby is in weight gaining pattern; will monitor baby's 24hr diaper counts; will discuss with pediatrician safety of use of Lexapro and Klonopin; will call insurance company to obtain personal use pump for home use; will call [employer] prn.

## 2018-03-12 NOTE — DISCHARGE SUMMARY
[facility]  Obstetrics  Discharge Summary      Patient Name: Mable Philip  MRN: [id number]  Admission Date: 3/10/2018  Hospital Length of Stay: 2 days  Discharge Date and Time:  2018 10:39 AM  Attending Physician: Adenike Mora, *   Discharging Provider: Keerthi Arriola MD  Primary Care Provider: Primary Doctor No    HPI: Mable Philip is a 32 y.o. F at 36w3d presents complaining of contractions since early this morning and vaginal bleeding beginning at approximately 4am. The bleeding has increased and she has gone through three pads since the bleeding started (about 8 hours ago). The contraction pain is in her back and she denies abdominal pain or tightening. The pains occur every 2 minutes and last for 30 seconds. She also notes vaginal leakage of fluids for the last 2 weeks. She notes good fetal movement.      This IUP is complicated by elevated blood pressures with negative pre-eclampsia workup.    * No surgery found *     Hospital Course:   3/10 Pt admitted to L+D due to labor  PPD 1: patient is without unusual complaints.  PPD 2: ready for DC    Consults         Status Ordering Provider     Inpatient consult to Anesthesiology  Once     Provider:  (Not yet assigned)    Acknowledged CHERYL ANDERSON          Final Active Diagnoses:    Diagnosis Date Noted POA    PRINCIPAL PROBLEM:  Vaginal delivery [O80] 03/10/2018 Not Applicable    Gestational hypertension affecting first pregnancy [O13.9] 03/10/2018 Yes      Problems Resolved During this Admission:    Diagnosis Date Noted Date Resolved POA    Uterine contractions during pregnancy [O62.2] 03/10/2018 03/10/2018 Yes    36 weeks gestation of pregnancy [Z3A.36] 03/10/2018 03/10/2018 Not Applicable    36 weeks gestation of pregnancy [Z3A.36] 03/10/2018 2018 Not Applicable        Labs: All labs within the past 24 hours have been reviewed    Feeding Method: breast    Immunizations     Date  Immunization Status Dose Route/Site Given by    03/12/18 0908 MMR Deferred (Other) 0.5 mL Subcutaneous/Left yoselinoid Jessie Juarez RN    03/12/18 0909 Tdap Deferred (Other) 0.5 mL Intramuscular/Left deltoid Jessie Juarez RN          Delivery:    Episiotomy:     Lacerations: 2nd   Repair suture:     Repair # of packets: 1   Blood loss (ml): 502     Birth information:  YOB: 2020   Time of birth: 4:47 PM   Sex: female   Delivery type: Vaginal, Spontaneous Delivery   Gestational Age: 36w3d    Delivery Clinician:      Other providers:       Additional  information:  Forceps:    Vacuum:    Breech:    Observed anomalies      Living?:           APGARS  One minute Five minutes Ten minutes   Skin color:         Heart rate:         Grimace:         Muscle tone:         Breathing:         Totals: 7  7  7      Placenta: Delivered:       appearance    Pending Diagnostic Studies:     None          Discharged Condition: good    Disposition: Home or Self Care    Follow Up:  Follow-up Information     Adenike Mora MD In 6 weeks.    Specialty:  Obstetrics and Gynecology  Why:  post partum exam  Contact information:  [address]  [address]  [facility] [id number] [phone]             Adenike Mora MD In 1 week.    Specialty:  Obstetrics and Gynecology  Why:  Blood pressure check  Contact information:  [address]  [address]  [facility] [id number] [phone]                 Patient Instructions:   Patient Instructions:   1. Call the office for any bleeding >2 pads/hour for >2 hours, temperature >100.4, pain that is uncontrolled with medications, or for any other concerns.  2. Pelvic rest and no tub baths x 6 weeks.  3. No driving while on narcotics.  4. Call office for overwhelming feelings of anxiety or sadness greater than 50% of the time  5. If no bowel movement for 1-2 days after discharge home, start Miralax (over the counter - take per package instructions) once a day. If still no bowel  movement, increase to twice a day. Decrease to once a day or discontinue once having regular bowel movements.  Patient Instructions for elevated blood pressure:   1. Call the office with headache that does not improve with Tylenol or visual changes  2. Call the office with blood pressure greater than or equal to 160/110  3. If you were discharged on a blood pressure medication, make sure you take the medication as prescribed  No discharge procedures on file.  Medications:  Current Discharge Medication List      START taking these medications    Details   breast pump Megha 1 Device by Misc.(Non-Drug; Combo Route) route as needed. Please dispense the brand of breast pump that is covered by patient insurance  Qty: 1 Device, Refills: 0      escitalopram oxalate (LEXAPRO) 5 MG Tab Take 1 tablet (5 mg total) by mouth once daily.  Qty: 30 tablet, Refills: 11      ibuprofen (ADVIL,MOTRIN) 600 MG tablet Take 1 tablet (600 mg total) by mouth every 6 (six) hours.  Qty: 60 tablet, Refills: 1      oxyCODONE-acetaminophen (PERCOCET) 5-325 mg per tablet Take 1 tablet by mouth every 4 (four) hours as needed.  Qty: 15 tablet, Refills: 0         CONTINUE these medications which have NOT CHANGED    Details   PRENATAL VIT,CALC76/IRON/FOLIC (PNV 29-1 ORAL) Take by mouth.         STOP taking these medications       DIPHENHYDRAMINE HCL (BENADRYL ALLERGY ORAL) Comments:   Reason for Stopping:               Keerthi Arriola MD  Obstetrics  [facility]

## 2018-03-12 NOTE — SUBJECTIVE & OBJECTIVE
Hospital course: 3/10 Pt admitted to L+D due to labor  PPD 1: patient is without unusual complaints.  PPD 2: ready for DC    Interval History:     She is doing well this morning. She is tolerating a regular diet without nausea or vomiting. She is voiding spontaneously. She is ambulating. She has passed flatus, and has a BM. Vaginal bleeding is mild. She denies fever or chills. Abdominal pain is mild and controlled with oral medications. She is breastfeeding.   Objective:     Vital Signs (Most Recent):  Temp: 97.6 °F (36.4 °C) (03/12/18 0900)  Pulse: (!) 113 (03/12/18 0900)  Resp: 18 (03/12/18 0900)  BP: 131/80 (03/12/18 0900)  SpO2: 98 % (03/12/18 0900) Vital Signs (24h Range):  Temp:  [97.6 °F (36.4 °C)-98.9 °F (37.2 °C)] 97.6 °F (36.4 °C)  Pulse:  [] 113  Resp:  [18] 18  SpO2:  [97 %-98 %] 98 %  BP: (131-145)/(65-82) 131/80     Weight: 84.8 kg (187 lb)  Body mass index is 31.12 kg/m².      Intake/Output Summary (Last 24 hours) at 03/12/18 1037  Last data filed at 03/11/18 1300   Gross per 24 hour   Intake                0 ml   Output              300 ml   Net             -300 ml       Significant Labs:  Lab Results   Component Value Date    GROUPTRH O POS 03/10/2018    HEPBSAG Negative 06/13/2017    STREPBCULT No Group B Streptococcus isolated 03/02/2018       Recent Labs  Lab 03/11/18  0500   HGB 9.2*   HCT 27.8*       I have personallly reviewed all pertinent lab results from the last 24 hours.    Physical Exam:   Constitutional: She is oriented to person, place, and time. She appears well-developed and well-nourished. No distress.    HENT:   Head: Normocephalic and atraumatic.      Cardiovascular: Normal rate, regular rhythm, normal heart sounds and intact distal pulses.     Pulmonary/Chest: Effort normal. No respiratory distress.        Abdominal: Soft. Bowel sounds are normal. She exhibits no mass.     Genitourinary: Uterus normal. No vaginal discharge found.   Genitourinary Comments: Firm fundus below  umbilicus           Musculoskeletal: Normal range of motion and moves all extremeties.       Neurological: She is alert and oriented to person, place, and time.    Skin: Skin is warm. She is not diaphoretic.    Psychiatric: She has a normal mood and affect. Her behavior is normal. Judgment and thought content normal.

## 2018-03-12 NOTE — DISCHARGE INSTRUCTIONS
Breastfeeding Discharge Instructions       Feed the baby at the earliest sign of hunger or comfort  o Hands to mouth, sucking motions  o Rooting or searching for something to suck on  o Dont wait for crying - it is a late sign of hunger and comfort.     The feedings may be 8-12 times per 24hrs and will not follow a schedule   Avoid pacifiers and bottles for the first 4 weeks   Alternate the breast you start the feeding with, or start with the breast that feels the fullest   Switch breasts when the baby takes himself off the breast or falls asleep   Keep offering breasts until the baby looks full, no longer gives hunger signs, and stays asleep when placed on his back in the crib   If the baby is sleepy and wont wake for a feeding, put the baby skin-to-skin dressed in a diaper against the mothers bare chest   Sleep near your baby   The baby should be positioned and latched on to the breast correctly  o Chest-to-chest, chin in the breast  o Babys lips are flipped outward  o Babys mouth is stretched open wide like a shout  o Babys sucking should feel like tugging to the mother  - The baby should be drinking at the breast:  o You should hear swallowing or gulping throughout the feeding  o You should see milk on the babys lips when he comes off the breast  o Your breasts should be softer when the baby is finished feeding  o The baby should look relaxed at the end of feedings  o After the 4th day and your milk is in:  o The babys poop should turn bright yellow and be loose, watery, and seedy  o The baby should have at least 3-4 poops the size of the palm of your hand per day  o The baby should have at least 6-8 wet diapers per day  o The urine should be light yellow in color  You should drink when you are thirsty and eat a healthy diet when you are    hungry.     Take naps to get the rest you need.   Take medications and/or drink alcohol only with permission of your obstetrician    or the babys  pediatrician.  You can also call the [facility],   ([phone]), Monday-Friday, 8am-5pm Central time, to get the most   up-to-date evidence-based information on the use of medications during   pregnancy and breastfeeding.      The baby should be examined by a pediatrician at 3-5 days of age.  Once your   milk comes in, the baby should be gaining at least ½ - 1oz each day and should be back to birthweight no later than 10-14 days of age.

## 2018-03-15 ENCOUNTER — TELEPHONE (OUTPATIENT)
Dept: LACTATION | Facility: CLINIC | Age: 33
End: 2018-03-15

## 2018-03-15 NOTE — TELEPHONE ENCOUNTER
Patient states that she has been using a manual pump and bottle feeding the baby. Pumping approximately every 2 hours 2 oz and the baby feeds every 2-3 hours. Weight is 5-4 today at pediatrician and is suppose to go back next week for check up. Baby is having at least 5-6 yellow seedy loose stools medium to large in size and minimum 5-6 wet diapers in 24 hour period. She is no longer attempting to latch the baby and does not plan to. Reinforced pumping 8 or more times/24 hours. Insurance pump suppose to arrive within the next day via mail.

## 2018-04-20 ENCOUNTER — POSTPARTUM VISIT (OUTPATIENT)
Dept: OBSTETRICS AND GYNECOLOGY | Facility: CLINIC | Age: 33
End: 2018-04-20
Payer: COMMERCIAL

## 2018-04-20 VITALS
HEIGHT: 65 IN | WEIGHT: 158.19 LBS | SYSTOLIC BLOOD PRESSURE: 114 MMHG | DIASTOLIC BLOOD PRESSURE: 62 MMHG | BODY MASS INDEX: 26.35 KG/M2

## 2018-04-20 PROCEDURE — 99999 PR PBB SHADOW E&M-EST. PATIENT-LVL III: CPT | Mod: PBBFAC,,, | Performed by: OBSTETRICS & GYNECOLOGY

## 2018-04-20 PROCEDURE — 0503F POSTPARTUM CARE VISIT: CPT | Mod: S$GLB,,, | Performed by: OBSTETRICS & GYNECOLOGY

## 2018-04-20 RX ORDER — FLUTICASONE PROPIONATE 50 MCG
SPRAY, SUSPENSION (ML) NASAL
COMMUNITY
Start: 2018-03-26 | End: 2020-05-12

## 2018-04-20 RX ORDER — CLONAZEPAM 1 MG/1
TABLET ORAL
COMMUNITY
Start: 2018-04-10 | End: 2021-12-01

## 2018-04-20 RX ORDER — ESCITALOPRAM OXALATE 20 MG/1
TABLET ORAL
COMMUNITY
Start: 2018-04-10 | End: 2021-10-06

## 2018-04-20 RX ORDER — LIDOCAINE HYDROCHLORIDE 20 MG/ML
SOLUTION ORAL; TOPICAL
COMMUNITY
Start: 2018-03-26 | End: 2020-07-20
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
## 2018-04-25 ENCOUNTER — TELEPHONE (OUTPATIENT)
Dept: PHARMACY | Facility: CLINIC | Age: 33
End: 2018-04-25

## 2018-04-25 NOTE — TELEPHONE ENCOUNTER
Pt returned call regarding Mirena. Explained that she has a $0.00 copay for Mirena on her pharmacy benefit but she may have a copay for her office visit. She declined pharmacist consultation. Will confirm delivery with [facility] staff.

## 2018-08-07 ENCOUNTER — TELEPHONE (OUTPATIENT)
Dept: OBSTETRICS AND GYNECOLOGY | Facility: CLINIC | Age: 33
End: 2018-08-07

## 2018-09-26 NOTE — TELEPHONE ENCOUNTER
Adenike, since you are here can you address this please   Verified patient's name, date of birth and allergies. Patient advised to remain in building 15 min after injection.

## 2018-10-16 ENCOUNTER — PROCEDURE VISIT (OUTPATIENT)
Dept: OBSTETRICS AND GYNECOLOGY | Facility: CLINIC | Age: 33
End: 2018-10-16
Payer: COMMERCIAL

## 2018-10-16 VITALS
DIASTOLIC BLOOD PRESSURE: 76 MMHG | WEIGHT: 133.06 LBS | HEIGHT: 65 IN | BODY MASS INDEX: 22.17 KG/M2 | SYSTOLIC BLOOD PRESSURE: 124 MMHG

## 2018-10-16 DIAGNOSIS — Z30.430 ENCOUNTER FOR IUD INSERTION: Primary | ICD-10-CM

## 2018-10-16 PROCEDURE — 58300 INSERT INTRAUTERINE DEVICE: CPT | Mod: S$GLB,,, | Performed by: OBSTETRICS & GYNECOLOGY

## 2018-10-16 RX ORDER — BUPROPION HYDROCHLORIDE 300 MG/1
TABLET ORAL
COMMUNITY
Start: 2018-09-13 | End: 2021-12-01

## 2018-10-16 RX ORDER — METHYLPREDNISOLONE 4 MG/1
TABLET ORAL
COMMUNITY
Start: 2018-07-17 | End: 2020-05-12

## 2018-10-18 NOTE — PROCEDURES
"Procedures         Chief Complaint: Desires IUD Placement     Subjective:      Mable Philip is a 32 y.o.  who presents today for IUD placement.  No LMP recorded.     The risks, benefits, and alternatives to IUD use were reviewed.  All of Ms. Philip's questions were answered. She voiced her understanding and agreed to proceed with placement of a Mirena IUD.     Objective:      UPT is negative    Vitals:    10/16/18 1341   BP: 124/76   Weight: 60.3 kg (133 lb 0.8 oz)   Height: 5' 5" (1.651 m)   PainSc: 0-No pain     GENERAL: Well nourished, well developed, in no acute distress.    IUD Placement Procedure:      Consents signed and time out performed.     The cervix was visualized with a speculum. A single tooth tenaculum was placed on the anterior lip of the cervix. The uterus sounded to 7 cm using sterile technique. The IUD was loaded and placed at the uterine fundus without difficulty. The strings were then cut. Tenaculum was removed and silver nitrate was placed at the tenaculum site with excellent hemostasis. All instruments removed from the vagina. The patient tolerated the procedure well.     Assessment/Plan:     Encounter for IUD insertion  -     levonorgestrel 20 mcg/24 hr (5 years) IUD 1 Intra Uterine Device        Counselin. For cramping NSAIDs or Tylenol were recommended.  2. Patient counseled that abnormal uterine bleeding is common in the first several months after IUD placement.  3. Patient instructed on how to feel for IUD strings.  4. Patient advised to call the office for heavy bleeding, significant pain, or a  foul-smelling vaginal discharge.  5. Patient counseled that if pregnancy does occur there is an increased chance of miscarriage or ectopic pregnancy, and she should seek medical care immediately.      "

## 2018-12-11 ENCOUNTER — IMMUNIZATION (OUTPATIENT)
Dept: PHARMACY | Facility: CLINIC | Age: 33
End: 2018-12-11
Payer: COMMERCIAL

## 2018-12-29 NOTE — PROGRESS NOTES
"Chief Complaint: Well Woman Exam     HPI:      Mable Philip is a 33 y.o.  who presents today for well woman exam.  LMP: No LMP recorded.  Mirena IUD placed 10/2018.  No issues, problems, or complaints. Specifically, patient denies abnormal vaginal bleeding, discharge, pelvic pain, urinary problems, or changes in appetite. Ms. Philip is currently sexually active with a single male partner. She would like STD screening today.    Previous Pap: Normal per patient, but she's unsure of when it was performed.    Ms. Philip confirms that she wears her seatbelt when riding in the car and does not text while driving.     OB History      Para Term  AB Living    1 1   1   1    SAB TAB Ectopic Multiple Live Births          0 1          ROS:     GENERAL: Denies unintentional weight gain or weight loss. Feeling well overall.   SKIN: Denies rash or lesions.   HEENT: Denies headaches, or vision changes.   CARDIOVASCULAR: Denies palpitations or chest pain.   RESPIRATORY: Denies shortness of breath or dyspnea on exertion.  BREASTS: Denies pain, lumps, or nipple discharge.   ABDOMEN: Denies abdominal pain, constipation, diarrhea, nausea, vomiting, change in appetite.  URINARY: Denies frequency, dysuria, hematuria.  NEUROLOGIC: Denies syncope or weakness.   PSYCHIATRIC: Denies depression, anxiety or mood swings.    Physical Exam:      PHYSICAL EXAM:  Ht 5' 5" (1.651 m)   Wt 59.5 kg (131 lb 2.8 oz)   Breastfeeding? No   BMI 21.83 kg/m²   Body mass index is 21.83 kg/m².     APPEARANCE: Well nourished, well developed, in no acute distress.  PSYCH: Appropriate mood and affect.  SKIN: No acne or hirsutism  NECK: Neck symmetric without masses or thyromegaly  NODES: No inguinal, axillary, or supraclavicular lymph node enlargement  ABDOMEN: Soft.  No tenderness or masses.    CARDIOVASCULAR: No edema of peripheral extremities  BREASTS: Symmetrical, no skin changes or visible lesions.  No palpable masses or " nipple discharge bilaterally.  PELVIC: Normal external genitalia without lesions.  Normal hair distribution.  Adequate perineal body, normal urethral meatus.  Vagina moist and well rugated without lesions or discharge.  Cervix pink, without lesions, discharge or tenderness.  No significant cystocele or rectocele.  Bimanual exam shows uterus to be normal size, regular, mobile and nontender.  Adnexa without masses or tenderness.        Assessment/Plan:     Cervical cancer screening  -     Liquid-based pap smear, screening  -     HPV High Risk Genotypes, PCR    Screen for STD (sexually transmitted disease)  -     C. trachomatis/N. gonorrhoeae by AMP DNA      Counseling:     Patient was counseled today on current ASCCP pap guidelines, the recommendation for yearly pelvic exams, healthy diet and exercise routines, breast self awareness.She is to see her PCP for other health maintenance.     Use of the [facility] Patient Portal discussed and encouraged during today's visit.

## 2019-01-04 ENCOUNTER — OFFICE VISIT (OUTPATIENT)
Dept: OBSTETRICS AND GYNECOLOGY | Facility: CLINIC | Age: 34
End: 2019-01-04
Payer: COMMERCIAL

## 2019-01-04 VITALS
DIASTOLIC BLOOD PRESSURE: 78 MMHG | SYSTOLIC BLOOD PRESSURE: 124 MMHG | HEIGHT: 65 IN | BODY MASS INDEX: 21.86 KG/M2 | WEIGHT: 131.19 LBS

## 2019-01-04 DIAGNOSIS — Z11.3 SCREEN FOR STD (SEXUALLY TRANSMITTED DISEASE): ICD-10-CM

## 2019-01-04 DIAGNOSIS — Z12.4 CERVICAL CANCER SCREENING: Primary | ICD-10-CM

## 2019-01-04 PROCEDURE — 88175 CYTOPATH C/V AUTO FLUID REDO: CPT

## 2019-01-04 PROCEDURE — 87624 HPV HI-RISK TYP POOLED RSLT: CPT

## 2019-01-04 PROCEDURE — 99999 PR PBB SHADOW E&M-EST. PATIENT-LVL III: CPT | Mod: PBBFAC,,, | Performed by: OBSTETRICS & GYNECOLOGY

## 2019-01-04 PROCEDURE — 87491 CHLMYD TRACH DNA AMP PROBE: CPT

## 2019-01-04 PROCEDURE — 99395 PR PREVENTIVE VISIT,EST,18-39: ICD-10-PCS | Mod: S$GLB,,, | Performed by: OBSTETRICS & GYNECOLOGY

## 2019-01-04 PROCEDURE — 99999 PR PBB SHADOW E&M-EST. PATIENT-LVL III: ICD-10-PCS | Mod: PBBFAC,,, | Performed by: OBSTETRICS & GYNECOLOGY

## 2019-01-04 PROCEDURE — 99395 PREV VISIT EST AGE 18-39: CPT | Mod: S$GLB,,, | Performed by: OBSTETRICS & GYNECOLOGY

## 2019-01-07 LAB
C TRACH DNA SPEC QL NAA+PROBE: NOT DETECTED
N GONORRHOEA DNA SPEC QL NAA+PROBE: NOT DETECTED

## 2019-01-11 LAB
HPV HR 12 DNA CVX QL NAA+PROBE: NEGATIVE
HPV16 AG SPEC QL: NEGATIVE
HPV18 DNA SPEC QL NAA+PROBE: NEGATIVE

## 2019-02-01 ENCOUNTER — PATIENT MESSAGE (OUTPATIENT)
Dept: PEDIATRICS | Facility: CLINIC | Age: 34
End: 2019-02-01

## 2019-02-01 NOTE — TELEPHONE ENCOUNTER
Please advise, thank you.    Patient: Kartik Philip (MRN [id number])    Account updated so mother can send future messages in patient's chart.

## 2019-03-26 ENCOUNTER — PATIENT MESSAGE (OUTPATIENT)
Dept: PEDIATRICS | Facility: CLINIC | Age: 34
End: 2019-03-26

## 2020-04-09 ENCOUNTER — TELEPHONE (OUTPATIENT)
Dept: OBSTETRICS AND GYNECOLOGY | Facility: CLINIC | Age: 35
End: 2020-04-09

## 2020-04-09 NOTE — TELEPHONE ENCOUNTER
Pt would like her iud removed at the time of visit . Will see if insurance covers it then will  See when we can schedule pt .

## 2020-04-28 ENCOUNTER — TELEPHONE (OUTPATIENT)
Dept: OBSTETRICS AND GYNECOLOGY | Facility: CLINIC | Age: 35
End: 2020-04-28

## 2020-05-12 ENCOUNTER — PROCEDURE VISIT (OUTPATIENT)
Dept: OBSTETRICS AND GYNECOLOGY | Facility: CLINIC | Age: 35
End: 2020-05-12
Payer: COMMERCIAL

## 2020-05-12 VITALS — WEIGHT: 132.25 LBS | TEMPERATURE: 99 F | BODY MASS INDEX: 22.03 KG/M2 | HEIGHT: 65 IN

## 2020-05-12 DIAGNOSIS — Z30.432 ENCOUNTER FOR IUD REMOVAL: Primary | ICD-10-CM

## 2020-05-12 PROCEDURE — 58301 REMOVE INTRAUTERINE DEVICE: CPT | Mod: S$GLB,,, | Performed by: OBSTETRICS & GYNECOLOGY

## 2020-05-12 PROCEDURE — 58301 PR REMOVE, INTRAUTERINE DEVICE: ICD-10-PCS | Mod: S$GLB,,, | Performed by: OBSTETRICS & GYNECOLOGY

## 2020-05-12 NOTE — PROCEDURES
"Procedures       Chief Complaint: Desires IUD Removal     Subjective:      Mable Philip is a 34 y.o.  who presents today for IUD removal.  No LMP recorded..     The risks, benefits, and alternatives to IUD removal were reviewed.  All of Ms. Philip's questions were answered. She voiced her understanding and agreed to proceed with removal.    Objective:     Vitals:    20 1110   Temp: 98.6 °F (37 °C)   Weight: 60 kg (132 lb 4.4 oz)   Height: 5' 5" (1.651 m)   PainSc: 0-No pain     GENERAL: Well nourished, well developed, in no acute distress.    IUD Removal Procedure:      Verbal consent was obtained.    Speculum was placed in the vagina and cervix visualized. IUD strings were visualized. Ring forceps were used to grasp the strings and the IUD was removed without difficulty.  All instruments removed from the vagina. The patient tolerated the procedure well.     Assessment/Plan:     Encounter for IUD removal  -     Removal of Intrauterine Device-Today        Contraception: no method, trying to conceive. Discussed starting PNV.     Counselin. For cramping NSAIDs or Tylenol were recommended.  2. Patient advised to call the office for heavy bleeding, significant pain, or a  foul-smelling vaginal discharge.    "
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
## 2020-07-20 ENCOUNTER — OFFICE VISIT (OUTPATIENT)
Dept: OBSTETRICS AND GYNECOLOGY | Facility: CLINIC | Age: 35
End: 2020-07-20
Payer: COMMERCIAL

## 2020-07-20 VITALS
WEIGHT: 140.56 LBS | HEIGHT: 65 IN | SYSTOLIC BLOOD PRESSURE: 100 MMHG | BODY MASS INDEX: 23.42 KG/M2 | DIASTOLIC BLOOD PRESSURE: 72 MMHG

## 2020-07-20 DIAGNOSIS — Z36.9 ENCOUNTER FOR ANTENATAL SCREENING, UNSPECIFIED: Primary | ICD-10-CM

## 2020-07-20 DIAGNOSIS — Z3A.11 11 WEEKS GESTATION OF PREGNANCY: ICD-10-CM

## 2020-07-20 PROCEDURE — 99213 OFFICE O/P EST LOW 20 MIN: CPT | Mod: S$GLB,,, | Performed by: OBSTETRICS & GYNECOLOGY

## 2020-07-20 PROCEDURE — 99999 PR PBB SHADOW E&M-EST. PATIENT-LVL III: CPT | Mod: PBBFAC,,, | Performed by: OBSTETRICS & GYNECOLOGY

## 2020-07-20 PROCEDURE — 3008F BODY MASS INDEX DOCD: CPT | Mod: CPTII,S$GLB,, | Performed by: OBSTETRICS & GYNECOLOGY

## 2020-07-20 PROCEDURE — 3008F PR BODY MASS INDEX (BMI) DOCUMENTED: ICD-10-PCS | Mod: CPTII,S$GLB,, | Performed by: OBSTETRICS & GYNECOLOGY

## 2020-07-20 PROCEDURE — 99999 PR PBB SHADOW E&M-EST. PATIENT-LVL III: ICD-10-PCS | Mod: PBBFAC,,, | Performed by: OBSTETRICS & GYNECOLOGY

## 2020-07-20 PROCEDURE — 99213 PR OFFICE/OUTPT VISIT, EST, LEVL III, 20-29 MIN: ICD-10-PCS | Mod: S$GLB,,, | Performed by: OBSTETRICS & GYNECOLOGY

## 2020-08-07 ENCOUNTER — INITIAL PRENATAL (OUTPATIENT)
Dept: OBSTETRICS AND GYNECOLOGY | Facility: CLINIC | Age: 35
End: 2020-08-07
Payer: COMMERCIAL

## 2020-08-07 ENCOUNTER — LAB VISIT (OUTPATIENT)
Dept: LAB | Facility: OTHER | Age: 35
End: 2020-08-07
Attending: OBSTETRICS & GYNECOLOGY
Payer: COMMERCIAL

## 2020-08-07 ENCOUNTER — PROCEDURE VISIT (OUTPATIENT)
Dept: OBSTETRICS AND GYNECOLOGY | Facility: CLINIC | Age: 35
End: 2020-08-07
Payer: COMMERCIAL

## 2020-08-07 DIAGNOSIS — O36.80X0 PREGNANCY WITH UNCERTAIN FETAL VIABILITY, SINGLE OR UNSPECIFIED FETUS: ICD-10-CM

## 2020-08-07 DIAGNOSIS — Z3A.11 11 WEEKS GESTATION OF PREGNANCY: ICD-10-CM

## 2020-08-07 DIAGNOSIS — O03.9 SAB (SPONTANEOUS ABORTION): Primary | ICD-10-CM

## 2020-08-07 DIAGNOSIS — O03.9 SAB (SPONTANEOUS ABORTION): ICD-10-CM

## 2020-08-07 LAB — HCG INTACT+B SERPL-ACNC: 1745 MIU/ML

## 2020-08-07 PROCEDURE — 99213 PR OFFICE/OUTPT VISIT, EST, LEVL III, 20-29 MIN: ICD-10-PCS | Mod: S$GLB,,, | Performed by: OBSTETRICS & GYNECOLOGY

## 2020-08-07 PROCEDURE — 99999 PR PBB SHADOW E&M-EST. PATIENT-LVL II: CPT | Mod: PBBFAC,,, | Performed by: OBSTETRICS & GYNECOLOGY

## 2020-08-07 PROCEDURE — 84702 CHORIONIC GONADOTROPIN TEST: CPT

## 2020-08-07 PROCEDURE — 76817 TRANSVAGINAL US OBSTETRIC: CPT | Mod: S$GLB,,, | Performed by: OBSTETRICS & GYNECOLOGY

## 2020-08-07 PROCEDURE — 76817 PR US, OB, TRANSVAG APPROACH: ICD-10-PCS | Mod: S$GLB,,, | Performed by: OBSTETRICS & GYNECOLOGY

## 2020-08-07 PROCEDURE — 99999 PR PBB SHADOW E&M-EST. PATIENT-LVL II: ICD-10-PCS | Mod: PBBFAC,,, | Performed by: OBSTETRICS & GYNECOLOGY

## 2020-08-07 PROCEDURE — 99213 OFFICE O/P EST LOW 20 MIN: CPT | Mod: S$GLB,,, | Performed by: OBSTETRICS & GYNECOLOGY

## 2020-08-07 PROCEDURE — 36415 COLL VENOUS BLD VENIPUNCTURE: CPT

## 2020-08-09 ENCOUNTER — PATIENT MESSAGE (OUTPATIENT)
Dept: OBSTETRICS AND GYNECOLOGY | Facility: CLINIC | Age: 35
End: 2020-08-09

## 2020-08-09 DIAGNOSIS — O36.80X0 PREGNANCY WITH UNCERTAIN FETAL VIABILITY, SINGLE OR UNSPECIFIED FETUS: Primary | ICD-10-CM

## 2020-08-09 NOTE — PROGRESS NOTES
Chief Complaint: Initial Pregnancy U/S     HPI:      Mable Philip is a 34 y.o.  who presents for initial pregnancy ultrasound. She had some cramping and light bleeding earlier in the week.     ROS:     GENERAL: Denies fevers or chills. Feeling well overall.   ABDOMEN: Denies abdominal pain, constipation, diarrhea, nausea, vomiting, change in appetite.   URINARY: Denies frequency, dysuria, hematuria.  NEUROLOGIC: Denies syncope or weakness.     Physical Exam:      PHYSICAL EXAM:    APPEARANCE: Well nourished, well developed, in mild distress after ultrasound    Results:      :Pelvic U/S today:   Given the patients clinical history of bleeding, this may represent an anembryonic gestation.   Empty gestational sac surrounded by KATE noted.    Assessment/Plan:     SAB (spontaneous )  -     hCG, quantitative; Future; Expected date: 2020    Will repeat hcg next week    Counselin. Counseled patient that there is nothing she or her partner did that caused this to happen.   2. Informed patient that she may continue to have bleeding for several days, but if she begins having bleeding >2 pads/hour for 2+ hours she should call the office.   3. Recommended Motrin, Tylenol, warm baths, and heating pads for cramping and pain.     20 minutes of face-to-face counseling time was spent today discussing the results of the ultrasound, causes of miscarriage, and the risks, benefits, and alternatives of the various treatment options.      Use of the [employer] Patient Portal discussed and encouraged during today's visit.

## 2020-08-11 ENCOUNTER — LAB VISIT (OUTPATIENT)
Dept: LAB | Facility: OTHER | Age: 35
End: 2020-08-11
Attending: OBSTETRICS & GYNECOLOGY
Payer: COMMERCIAL

## 2020-08-11 DIAGNOSIS — O36.80X0 PREGNANCY WITH UNCERTAIN FETAL VIABILITY, SINGLE OR UNSPECIFIED FETUS: ICD-10-CM

## 2020-08-11 DIAGNOSIS — O03.9 SAB (SPONTANEOUS ABORTION): Primary | ICD-10-CM

## 2020-08-11 LAB — HCG INTACT+B SERPL-ACNC: 816 MIU/ML

## 2020-08-11 PROCEDURE — 84702 CHORIONIC GONADOTROPIN TEST: CPT

## 2020-08-11 PROCEDURE — 36415 COLL VENOUS BLD VENIPUNCTURE: CPT

## 2020-08-19 ENCOUNTER — LAB VISIT (OUTPATIENT)
Dept: LAB | Facility: OTHER | Age: 35
End: 2020-08-19
Attending: OBSTETRICS & GYNECOLOGY
Payer: COMMERCIAL

## 2020-08-19 DIAGNOSIS — O03.9 SAB (SPONTANEOUS ABORTION): ICD-10-CM

## 2020-08-19 LAB — HCG INTACT+B SERPL-ACNC: 167 MIU/ML

## 2020-08-19 PROCEDURE — 84702 CHORIONIC GONADOTROPIN TEST: CPT

## 2020-08-19 PROCEDURE — 36415 COLL VENOUS BLD VENIPUNCTURE: CPT

## 2020-11-06 ENCOUNTER — PATIENT MESSAGE (OUTPATIENT)
Dept: OBSTETRICS AND GYNECOLOGY | Facility: CLINIC | Age: 35
End: 2020-11-06

## 2020-11-09 ENCOUNTER — PATIENT MESSAGE (OUTPATIENT)
Dept: OBSTETRICS AND GYNECOLOGY | Facility: CLINIC | Age: 35
End: 2020-11-09

## 2020-11-09 ENCOUNTER — TELEPHONE (OUTPATIENT)
Dept: OBSTETRICS AND GYNECOLOGY | Facility: CLINIC | Age: 35
End: 2020-11-09

## 2020-11-09 DIAGNOSIS — N93.9 ABNORMAL UTERINE BLEEDING (AUB): Primary | ICD-10-CM

## 2020-11-09 NOTE — TELEPHONE ENCOUNTER
Called pt on 11/9 @ 119pm per Dr. Mora     Pt did not answer. Left  stating requesting pt to call office back to schedule appt in December at [phone]

## 2020-11-09 NOTE — TELEPHONE ENCOUNTER
"Pt started her period on Friday.  It was extremely heavy for about 3 hours. "poured out of me onto the flood, all over the toilet seat and looked like fake blood in the toilet".  Felt dizzy and weak after but it improved after she ate.  Not on OCPs. Had a SAB in August.  LMP beginning of September but not sure. Negative UPT    She wants to be seen ASAP but you don't have anything within the next 2 weeks.   "

## 2020-11-09 NOTE — TELEPHONE ENCOUNTER
----- Message from Adenike Mora MD sent at 11/9/2020  8:52 AM CST -----  Please call patient and set her up for a December appointment. Thank you.

## 2020-11-30 ENCOUNTER — HOSPITAL ENCOUNTER (OUTPATIENT)
Dept: RADIOLOGY | Facility: OTHER | Age: 35
Discharge: HOME | End: 2020-11-30
Attending: OBSTETRICS & GYNECOLOGY
Payer: COMMERCIAL

## 2020-11-30 DIAGNOSIS — N93.9 ABNORMAL UTERINE BLEEDING (AUB): ICD-10-CM

## 2020-11-30 PROCEDURE — 76830 TRANSVAGINAL US NON-OB: CPT | Mod: TC

## 2020-11-30 PROCEDURE — 76830 US PELVIS COMP WITH TRANSVAG NON-OB (XPD): ICD-10-PCS | Mod: 26,,, | Performed by: INTERNAL MEDICINE

## 2020-11-30 PROCEDURE — 76856 US EXAM PELVIC COMPLETE: CPT | Mod: 26,,, | Performed by: INTERNAL MEDICINE

## 2020-11-30 PROCEDURE — 76830 TRANSVAGINAL US NON-OB: CPT | Mod: 26,,, | Performed by: INTERNAL MEDICINE

## 2020-11-30 PROCEDURE — 76856 US PELVIS COMP WITH TRANSVAG NON-OB (XPD): ICD-10-PCS | Mod: 26,,, | Performed by: INTERNAL MEDICINE

## 2020-12-02 ENCOUNTER — TELEPHONE (OUTPATIENT)
Dept: OBSTETRICS AND GYNECOLOGY | Facility: CLINIC | Age: 35
End: 2020-12-02

## 2020-12-02 NOTE — TELEPHONE ENCOUNTER
Message left for patient that U/S results available to discuss.   Pt will need D&C, likely has small amount of retained products.

## 2020-12-03 ENCOUNTER — TELEPHONE (OUTPATIENT)
Dept: OBSTETRICS AND GYNECOLOGY | Facility: CLINIC | Age: 35
End: 2020-12-03

## 2020-12-03 DIAGNOSIS — N93.9 ABNORMAL UTERINE BLEEDING (AUB): Primary | ICD-10-CM

## 2020-12-03 DIAGNOSIS — Z01.818 PRE-PROCEDURAL EXAMINATION: Primary | ICD-10-CM

## 2020-12-04 ENCOUNTER — HOSPITAL ENCOUNTER (OUTPATIENT)
Dept: PREADMISSION TESTING | Facility: OTHER | Age: 35
Discharge: HOME | End: 2020-12-04
Attending: OBSTETRICS & GYNECOLOGY
Payer: COMMERCIAL

## 2020-12-04 ENCOUNTER — ANESTHESIA EVENT (OUTPATIENT)
Dept: SURGERY | Facility: OTHER | Age: 35
End: 2020-12-04
Payer: COMMERCIAL

## 2020-12-04 VITALS
HEART RATE: 90 BPM | BODY MASS INDEX: 23.32 KG/M2 | SYSTOLIC BLOOD PRESSURE: 117 MMHG | HEIGHT: 65 IN | DIASTOLIC BLOOD PRESSURE: 68 MMHG | WEIGHT: 140 LBS | OXYGEN SATURATION: 99 % | TEMPERATURE: 97 F

## 2020-12-04 RX ORDER — ACETAMINOPHEN 500 MG
1000 TABLET ORAL
Status: CANCELLED | OUTPATIENT
Start: 2020-12-04 | End: 2020-12-04

## 2020-12-04 RX ORDER — SODIUM CHLORIDE, SODIUM LACTATE, POTASSIUM CHLORIDE, CALCIUM CHLORIDE 600; 310; 30; 20 MG/100ML; MG/100ML; MG/100ML; MG/100ML
INJECTION, SOLUTION INTRAVENOUS CONTINUOUS
Status: CANCELLED | OUTPATIENT
Start: 2020-12-04

## 2020-12-04 NOTE — ANESTHESIA PREPROCEDURE EVALUATION
12/04/2020  Mable Philip is a 35 y.o., female.    Anesthesia Evaluation    I have reviewed the Patient Summary Reports.    I have reviewed the Nursing Notes. I have reviewed the NPO Status.   I have reviewed the Medications.     Review of Systems  Anesthesia Hx:  No problems with previous Anesthesia  Denies Family Hx of Anesthesia complications.   Denies Personal Hx of Anesthesia complications.   Social:  Non-Smoker    Hematology/Oncology:  Hematology Normal   Oncology Normal     EENT/Dental:EENT/Dental Normal   Cardiovascular:   Exercise tolerance: good Hypertension    Pulmonary:  Pulmonary Normal    Renal/:  Renal/ Normal     Musculoskeletal:  Musculoskeletal Normal    Neurological:  Neurology Normal    Endocrine:  Endocrine Normal    Dermatological:  Skin Normal    Psych:   anxiety depression          Physical Exam  General:  Well nourished    Airway/Jaw/Neck:  Airway Findings: Mouth Opening: Normal Tongue: Normal  General Airway Assessment: Adult  Mallampati: I  TM Distance: Normal, at least 6 cm  Jaw/Neck Findings:  Neck ROM: Normal ROM      Dental:  Dental Findings: In tact             Anesthesia Plan  Type of Anesthesia, risks & benefits discussed:  Anesthesia Type:  general  Patient's Preference:   Intra-op Monitoring Plan: standard ASA monitors  Intra-op Monitoring Plan Comments:   Post Op Pain Control Plan: per primary service following discharge from PACU and multimodal analgesia  Post Op Pain Control Plan Comments:   Induction:   IV  Beta Blocker:         Informed Consent: Patient understands risks and agrees with Anesthesia plan.  Questions answered. Anesthesia consent signed with patient.  ASA Score: 1     Day of Surgery Review of History & Physical:    H&P update referred to the surgeon.         Ready For Surgery From Anesthesia Perspective.

## 2020-12-04 NOTE — DISCHARGE INSTRUCTIONS
Information to Prepare you for your Surgery    PRE-ADMIT TESTING -  [phone]    [address]  [place] [facility]          Your surgery has been scheduled at [facility]. We are pleased to have the opportunity to serve you. For Further Information please call [phone].    On the day of surgery please report to the Information Desk on the 1st floor.    · CONTACT YOUR PHYSICIAN'S OFFICE THE DAY PRIOR TO YOUR SURGERY TO OBTAIN YOUR ARRIVAL TIME.     · The evening before surgery do not eat anything after 9 p.m. ( this includes hard candy, chewing gum and mints).  You may only have GATORADE, POWERADE AND WATER  from 9 p.m. until you leave your home.   DO NOT DRINK ANY LIQUIDS ON THE WAY TO THE HOSPITAL.      SPECIAL MEDICATION INSTRUCTIONS: TAKE medications checked off by the Anesthesiologist on your Medication List.    Angiogram Patients: Take medications as instructed by your physician, including aspirin.     Surgery Patients:    If you take ASPIRIN - Your PHYSICIAN/SURGEON will need to inform you IF/OR when you need to stop taking aspirin prior to your surgery.     Do Not take any medications containing IBUPROFEN.  Do Not Wear any make-up or dark nail polish   (especially eye make-up) to surgery. If you come to surgery with makeup on you will be required to remove the makeup or nail polish.    Do not shave your surgical area at least 5 days prior to your surgery. The surgical prep will be performed at the hospital according to Infection Control regulations.    Leave all valuables at home.   Do Not wear any jewelry or watches, including any metal in body piercings. Jewelry must be removed prior to coming to the hospital.  There is a possibility that rings that are unable to be removed may be cut off if they are on the surgical extremity.    Contact Lens must be removed before surgery. Either do not wear the contact lens or bring a case and solution for  storage.  Please bring a container for eyeglasses or dentures as required.  Bring any paperwork your physician has provided, such as consent forms,  history and physicals, doctor's orders, etc.   Bring comfortable clothes that are loose fitting to wear upon discharge. Take into consideration the type of surgery being performed.  Maintain your diet as advised per your physician the day prior to surgery.      Adequate rest the night before surgery is advised.   Park in the Parking lot behind the hospital or in the [facility] across the street from the parking lot. Parking is complimentary.  If you will be discharged the same day as your procedure, please arrange for a responsible adult to drive you home or to accompany you if traveling by taxi.   YOU WILL NOT BE PERMITTED TO DRIVE OR TO LEAVE THE HOSPITAL ALONE AFTER SURGERY.   If you are being discharged the same day, it is strongly recommended that you arrange for someone to remain with you for the first 24 hrs following your surgery.    The Surgeon will speak to your family/visitor after your surgery regarding the outcome of your surgery and post op care.  The Surgeon may speak to you after your surgery, but there is a possibility you may not remember the details.  Please check with your family members regarding the conversation with the Surgeon.    We strongly recommend whoever is bringing you home be present for discharge instructions.  This will ensure a thorough understanding for your post op home care.    ALL CHILDREN MUST ALWAYS BE ACCOMPANIED BY AN ADULT.    Visitors-Refer to current Visitor policy handouts.    Thank you for your cooperation.  The Staff of [facility].                Bathing Instructions with Hibiclens     Shower the evening before and morning of your procedure with Hibiclens:   Wash your face with water and your regular face wash/soap   Apply Hibiclens directly on your skin or on a wet washcloth and wash  gently. When showering: Move away from the shower stream when applying Hibiclens to avoid rinsing off too soon.   Rinse thoroughly with warm water   Do not dilute Hibiclens         Dry off as usual, do not use any deodorant, powder, body lotions, perfume, after shave or cologne.

## 2020-12-05 ENCOUNTER — LAB VISIT (OUTPATIENT)
Dept: URGENT CARE | Facility: CLINIC | Age: 35
End: 2020-12-05
Payer: COMMERCIAL

## 2020-12-05 DIAGNOSIS — Z01.818 PRE-PROCEDURAL EXAMINATION: ICD-10-CM

## 2020-12-05 PROCEDURE — U0003 INFECTIOUS AGENT DETECTION BY NUCLEIC ACID (DNA OR RNA); SEVERE ACUTE RESPIRATORY SYNDROME CORONAVIRUS 2 (SARS-COV-2) (CORONAVIRUS DISEASE [COVID-19]), AMPLIFIED PROBE TECHNIQUE, MAKING USE OF HIGH THROUGHPUT TECHNOLOGIES AS DESCRIBED BY CMS-2020-01-R: HCPCS

## 2020-12-05 PROCEDURE — 99211 PR OFFICE/OUTPT VISIT, EST, LEVL I: ICD-10-PCS | Mod: S$GLB,,, | Performed by: NURSE PRACTITIONER

## 2020-12-05 PROCEDURE — 99211 OFF/OP EST MAY X REQ PHY/QHP: CPT | Mod: S$GLB,,, | Performed by: NURSE PRACTITIONER

## 2020-12-06 LAB — SARS-COV-2 RNA RESP QL NAA+PROBE: NOT DETECTED
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
## 2020-12-08 ENCOUNTER — HOSPITAL ENCOUNTER (OUTPATIENT)
Facility: OTHER | Age: 35
Discharge: HOME | End: 2020-12-08
Attending: OBSTETRICS & GYNECOLOGY | Admitting: OBSTETRICS & GYNECOLOGY
Payer: COMMERCIAL

## 2020-12-08 ENCOUNTER — ANESTHESIA (OUTPATIENT)
Dept: SURGERY | Facility: OTHER | Age: 35
End: 2020-12-08
Payer: COMMERCIAL

## 2020-12-08 VITALS
BODY MASS INDEX: 23.32 KG/M2 | HEART RATE: 86 BPM | SYSTOLIC BLOOD PRESSURE: 110 MMHG | TEMPERATURE: 98 F | OXYGEN SATURATION: 99 % | WEIGHT: 140 LBS | DIASTOLIC BLOOD PRESSURE: 64 MMHG | HEIGHT: 65 IN | RESPIRATION RATE: 16 BRPM

## 2020-12-08 DIAGNOSIS — O03.4 RETAINED PRODUCTS OF CONCEPTION AFTER MISCARRIAGE: Primary | ICD-10-CM

## 2020-12-08 LAB
B-HCG UR QL: NEGATIVE
CTP QC/QA: YES

## 2020-12-08 PROCEDURE — 88305 TISSUE EXAM BY PATHOLOGIST: ICD-10-PCS | Mod: 26,,, | Performed by: PATHOLOGY

## 2020-12-08 PROCEDURE — 71000033 HC RECOVERY, INTIAL HOUR: Performed by: OBSTETRICS & GYNECOLOGY

## 2020-12-08 PROCEDURE — 25000003 PHARM REV CODE 250: Performed by: REGISTERED NURSE

## 2020-12-08 PROCEDURE — 37000009 HC ANESTHESIA EA ADD 15 MINS: Performed by: OBSTETRICS & GYNECOLOGY

## 2020-12-08 PROCEDURE — 25000003 PHARM REV CODE 250: Performed by: ANESTHESIOLOGY

## 2020-12-08 PROCEDURE — 25000003 PHARM REV CODE 250: Performed by: OBSTETRICS & GYNECOLOGY

## 2020-12-08 PROCEDURE — 71000015 HC POSTOP RECOV 1ST HR: Performed by: OBSTETRICS & GYNECOLOGY

## 2020-12-08 PROCEDURE — 25000003 PHARM REV CODE 250: Performed by: SPECIALIST

## 2020-12-08 PROCEDURE — 58558 PR HYSTEROSCOPY,W/ENDO BX: ICD-10-PCS | Mod: ,,, | Performed by: OBSTETRICS & GYNECOLOGY

## 2020-12-08 PROCEDURE — C1782 MORCELLATOR: HCPCS | Performed by: OBSTETRICS & GYNECOLOGY

## 2020-12-08 PROCEDURE — 81025 URINE PREGNANCY TEST: CPT | Performed by: ANESTHESIOLOGY

## 2020-12-08 PROCEDURE — 37000008 HC ANESTHESIA 1ST 15 MINUTES: Performed by: OBSTETRICS & GYNECOLOGY

## 2020-12-08 PROCEDURE — 63600175 PHARM REV CODE 636 W HCPCS: Performed by: OBSTETRICS & GYNECOLOGY

## 2020-12-08 PROCEDURE — 88305 TISSUE EXAM BY PATHOLOGIST: CPT | Performed by: PATHOLOGY

## 2020-12-08 PROCEDURE — 27201423 OPTIME MED/SURG SUP & DEVICES STERILE SUPPLY: Performed by: OBSTETRICS & GYNECOLOGY

## 2020-12-08 PROCEDURE — 88305 TISSUE EXAM BY PATHOLOGIST: CPT | Mod: 26,,, | Performed by: PATHOLOGY

## 2020-12-08 PROCEDURE — 36000707: Performed by: OBSTETRICS & GYNECOLOGY

## 2020-12-08 PROCEDURE — 63600175 PHARM REV CODE 636 W HCPCS: Performed by: REGISTERED NURSE

## 2020-12-08 PROCEDURE — 63600175 PHARM REV CODE 636 W HCPCS: Performed by: ANESTHESIOLOGY

## 2020-12-08 PROCEDURE — 36000706: Performed by: OBSTETRICS & GYNECOLOGY

## 2020-12-08 PROCEDURE — 58558 HYSTEROSCOPY BIOPSY: CPT | Mod: ,,, | Performed by: OBSTETRICS & GYNECOLOGY

## 2020-12-08 PROCEDURE — 71000016 HC POSTOP RECOV ADDL HR: Performed by: OBSTETRICS & GYNECOLOGY

## 2020-12-08 RX ORDER — OXYCODONE AND ACETAMINOPHEN 5; 325 MG/1; MG/1
1 TABLET ORAL EVERY 4 HOURS PRN
Qty: 5 TABLET | Refills: 0 | Status: SHIPPED | OUTPATIENT
Start: 2020-12-08 | End: 2021-05-19

## 2020-12-08 RX ORDER — MEPERIDINE HYDROCHLORIDE 25 MG/ML
12.5 INJECTION INTRAMUSCULAR; INTRAVENOUS; SUBCUTANEOUS ONCE AS NEEDED
Status: DISCONTINUED | OUTPATIENT
Start: 2020-12-08 | End: 2020-12-08 | Stop reason: HOSPADM

## 2020-12-08 RX ORDER — DIPHENHYDRAMINE HYDROCHLORIDE 50 MG/ML
25 INJECTION INTRAMUSCULAR; INTRAVENOUS EVERY 4 HOURS PRN
Status: CANCELLED | OUTPATIENT
Start: 2020-12-08

## 2020-12-08 RX ORDER — MUPIROCIN 20 MG/G
OINTMENT TOPICAL
Status: DISPENSED | OUTPATIENT
Start: 2020-12-08

## 2020-12-08 RX ORDER — KETOROLAC TROMETHAMINE 30 MG/ML
INJECTION, SOLUTION INTRAMUSCULAR; INTRAVENOUS
Status: DISCONTINUED | OUTPATIENT
Start: 2020-12-08 | End: 2020-12-08

## 2020-12-08 RX ORDER — SODIUM CHLORIDE 9 MG/ML
INJECTION, SOLUTION INTRAVENOUS CONTINUOUS
Status: ACTIVE | OUTPATIENT
Start: 2020-12-08

## 2020-12-08 RX ORDER — HYDROMORPHONE HYDROCHLORIDE 2 MG/ML
0.4 INJECTION, SOLUTION INTRAMUSCULAR; INTRAVENOUS; SUBCUTANEOUS EVERY 5 MIN PRN
Status: DISCONTINUED | OUTPATIENT
Start: 2020-12-08 | End: 2020-12-08 | Stop reason: HOSPADM

## 2020-12-08 RX ORDER — OXYCODONE HYDROCHLORIDE 5 MG/1
5 TABLET ORAL
Status: DISCONTINUED | OUTPATIENT
Start: 2020-12-08 | End: 2020-12-08 | Stop reason: HOSPADM

## 2020-12-08 RX ORDER — DIPHENHYDRAMINE HYDROCHLORIDE 50 MG/ML
25 INJECTION INTRAMUSCULAR; INTRAVENOUS EVERY 6 HOURS PRN
Status: DISCONTINUED | OUTPATIENT
Start: 2020-12-08 | End: 2020-12-08 | Stop reason: HOSPADM

## 2020-12-08 RX ORDER — FENTANYL CITRATE 50 UG/ML
INJECTION, SOLUTION INTRAMUSCULAR; INTRAVENOUS
Status: DISCONTINUED | OUTPATIENT
Start: 2020-12-08 | End: 2020-12-08

## 2020-12-08 RX ORDER — MIDAZOLAM HYDROCHLORIDE 1 MG/ML
INJECTION INTRAMUSCULAR; INTRAVENOUS
Status: DISCONTINUED | OUTPATIENT
Start: 2020-12-08 | End: 2020-12-08

## 2020-12-08 RX ORDER — IBUPROFEN 800 MG/1
800 TABLET ORAL EVERY 8 HOURS PRN
Qty: 20 TABLET | Refills: 0 | Status: SHIPPED | OUTPATIENT
Start: 2020-12-08 | End: 2021-08-10

## 2020-12-08 RX ORDER — SODIUM CHLORIDE 0.9 % (FLUSH) 0.9 %
3 SYRINGE (ML) INJECTION
Status: DISCONTINUED | OUTPATIENT
Start: 2020-12-08 | End: 2020-12-08 | Stop reason: HOSPADM

## 2020-12-08 RX ORDER — CEFAZOLIN SODIUM 1 G/3ML
2 INJECTION, POWDER, FOR SOLUTION INTRAMUSCULAR; INTRAVENOUS
Status: COMPLETED | OUTPATIENT
Start: 2020-12-08 | End: 2020-12-08

## 2020-12-08 RX ORDER — ONDANSETRON 2 MG/ML
INJECTION INTRAMUSCULAR; INTRAVENOUS
Status: DISCONTINUED | OUTPATIENT
Start: 2020-12-08 | End: 2020-12-08

## 2020-12-08 RX ORDER — HYDROCODONE BITARTRATE AND ACETAMINOPHEN 5; 325 MG/1; MG/1
1 TABLET ORAL EVERY 4 HOURS PRN
Status: CANCELLED | OUTPATIENT
Start: 2020-12-08

## 2020-12-08 RX ORDER — ACETAMINOPHEN 500 MG
1000 TABLET ORAL
Status: COMPLETED | OUTPATIENT
Start: 2020-12-08 | End: 2020-12-08

## 2020-12-08 RX ORDER — DEXAMETHASONE SODIUM PHOSPHATE 4 MG/ML
INJECTION, SOLUTION INTRA-ARTICULAR; INTRALESIONAL; INTRAMUSCULAR; INTRAVENOUS; SOFT TISSUE
Status: DISCONTINUED | OUTPATIENT
Start: 2020-12-08 | End: 2020-12-08

## 2020-12-08 RX ORDER — ONDANSETRON 2 MG/ML
4 INJECTION INTRAMUSCULAR; INTRAVENOUS DAILY PRN
Status: DISCONTINUED | OUTPATIENT
Start: 2020-12-08 | End: 2020-12-08 | Stop reason: HOSPADM

## 2020-12-08 RX ORDER — PROPOFOL 10 MG/ML
INJECTION, EMULSION INTRAVENOUS
Status: DISCONTINUED | OUTPATIENT
Start: 2020-12-08 | End: 2020-12-08

## 2020-12-08 RX ORDER — LIDOCAINE HCL/PF 100 MG/5ML
SYRINGE (ML) INTRAVENOUS
Status: DISCONTINUED | OUTPATIENT
Start: 2020-12-08 | End: 2020-12-08

## 2020-12-08 RX ORDER — DIPHENHYDRAMINE HCL 25 MG
25 CAPSULE ORAL EVERY 4 HOURS PRN
Status: CANCELLED | OUTPATIENT
Start: 2020-12-08

## 2020-12-08 RX ORDER — ONDANSETRON 8 MG/1
8 TABLET, ORALLY DISINTEGRATING ORAL EVERY 8 HOURS PRN
Status: DISCONTINUED | OUTPATIENT
Start: 2020-12-08 | End: 2020-12-08 | Stop reason: HOSPADM

## 2020-12-08 RX ORDER — SODIUM CHLORIDE, SODIUM LACTATE, POTASSIUM CHLORIDE, CALCIUM CHLORIDE 600; 310; 30; 20 MG/100ML; MG/100ML; MG/100ML; MG/100ML
INJECTION, SOLUTION INTRAVENOUS CONTINUOUS
Status: DISCONTINUED | OUTPATIENT
Start: 2020-12-08 | End: 2020-12-08 | Stop reason: HOSPADM

## 2020-12-08 RX ADMIN — OXYCODONE HYDROCHLORIDE 5 MG: 5 TABLET ORAL at 12:12

## 2020-12-08 RX ADMIN — MIDAZOLAM HYDROCHLORIDE 2 MG: 1 INJECTION, SOLUTION INTRAMUSCULAR; INTRAVENOUS at 12:12

## 2020-12-08 RX ADMIN — KETOROLAC TROMETHAMINE 30 MG: 30 INJECTION, SOLUTION INTRAMUSCULAR; INTRAVENOUS at 12:12

## 2020-12-08 RX ADMIN — ONDANSETRON HYDROCHLORIDE 4 MG: 2 INJECTION INTRAMUSCULAR; INTRAVENOUS at 12:12

## 2020-12-08 RX ADMIN — LIDOCAINE HYDROCHLORIDE 100 MG: 20 INJECTION, SOLUTION INTRAVENOUS at 12:12

## 2020-12-08 RX ADMIN — ACETAMINOPHEN 1000 MG: 500 TABLET, FILM COATED ORAL at 10:12

## 2020-12-08 RX ADMIN — CARBOXYMETHYLCELLULOSE SODIUM 1 DROP: 2.5 SOLUTION/ DROPS OPHTHALMIC at 12:12

## 2020-12-08 RX ADMIN — DEXAMETHASONE SODIUM PHOSPHATE 4 MG: 4 INJECTION, SOLUTION INTRAMUSCULAR; INTRAVENOUS at 12:12

## 2020-12-08 RX ADMIN — MUPIROCIN: 20 OINTMENT TOPICAL at 10:12

## 2020-12-08 RX ADMIN — FENTANYL CITRATE 50 MCG: 50 INJECTION, SOLUTION INTRAMUSCULAR; INTRAVENOUS at 12:12

## 2020-12-08 RX ADMIN — PROPOFOL 200 MG: 10 INJECTION, EMULSION INTRAVENOUS at 12:12

## 2020-12-08 RX ADMIN — SODIUM CHLORIDE, SODIUM LACTATE, POTASSIUM CHLORIDE, AND CALCIUM CHLORIDE: 600; 310; 30; 20 INJECTION, SOLUTION INTRAVENOUS at 11:12

## 2020-12-08 RX ADMIN — CEFAZOLIN 2 G: 330 INJECTION, POWDER, FOR SOLUTION INTRAMUSCULAR; INTRAVENOUS at 12:12

## 2020-12-08 NOTE — DISCHARGE INSTRUCTIONS
Call the office to schedule f/u appointment in the next 2-4 weeks.      Anesthesia: After Your Surgery  Youve just had surgery. During surgery, you received medication called anesthesia to keep you comfortable and pain-free. After surgery, you may experience some pain or nausea. This is common. Here are some tips for feeling better and recovering after surgery.    Going home  Your doctor or nurse will show you how to take care of yourself when you go home. He or she will also answer your questions. Have an adult family member or friend drive you home. For the first 24 hours after your surgery:  · Do not drive or use heavy equipment.  · Do not make important decisions or sign legal documents.  · Avoid alcohol.  · Have someone stay with you, if needed. He or she can watch for problems and help keep you safe.  · Take your time getting up from a seated or lying position. You may experience dizziness for 24 hours  Be sure to keep all follow-up appointments with your doctor. And rest after your procedure for as long as your doctor tells you to.    Coping with pain  If you have pain after surgery, pain medication will help you feel better. Take it as directed, before pain becomes severe. Also, ask your doctor or pharmacist about other ways to control pain, such as with heat, ice, and relaxation. And follow any other instructions your surgeon or nurse gives you.    URINARY RETENTION  Should you experience a decrease in your urine output or are unable to urinate following surgery, this can be due to the medications given during surgery.  We recommend you going to the nearest Emergency Department.    Tips for taking pain medication  To get the best relief possible, remember these points:  · Pain medications can upset your stomach. Taking them with a little food may help.  · Most pain relievers taken by mouth need at least 20 to 30 minutes to take effect.  · Taking medication on a schedule can help you remember to take it.  Try to time your medication so that you can take it before beginning an activity, such as dressing, walking, or sitting down for dinner.  · Constipation is a common side effect of pain medications. Contact your doctor before taking any medications like laxatives or stool softeners to help relieve constipation. Also ask about any dietary restrictions, because drinking lots of fluids and eating foods like fruits and vegetables that are high in fiber can also help. Remember, dont take laxatives unless your surgeon has prescribed them.  · Mixing alcohol and pain medication can cause dizziness and slow your breathing. It can even be fatal. Dont drink alcohol while taking pain medication.  · Pain medication can slow your reflexes. Dont drive or operate machinery while taking pain medication.  If your health care provider tells you to take acetaminophen to help relieve your pain, ask him or her how much you are supposed to take each day. (Acetaminophen is the generic name for Tylenol and other brand-name pain relievers.) Acetaminophen or other pain relievers may interact with your prescription medicines or other over-the-counter (OTC) drugs. Some prescription medications contain acetaminophen along with other active ingredients. Using both prescription and OTC acetaminophen for pain can cause you to overdose. The FDA recommends that you read the labels on your OTC medications carefully. This will help you to clearly understand the list of active ingredients, dosing instructions, and any warnings. It may also help you avoid taking too much acetaminophen. If you have questions or don't understand the information, ask your pharmacist or health care provider to explain it to you before you take the OTC medication.    Managing nausea  Some people have an upset stomach after surgery. This is often due to anesthesia, pain, pain medications, or the stress of surgery. The following tips will help you manage nausea and get good  nutrition as you recover. If you were on a special diet before surgery, ask your doctor if you should follow it during recovery. These tips may help:  · Dont push yourself to eat. Your body will tell you when to eat and how much.  · Start off with clear liquids and soup. They are easier to digest.  · Progress to semi-solid foods (mashed potatoes, applesauce, and gelatin) as you feel ready.  · Slowly move to solid foods. Dont eat fatty, rich, or spicy foods at first.  · Dont force yourself to have three large meals a day. Instead, eat smaller amounts more often.  · Take pain medications with a small amount of solid food, such as crackers or toast to avoid nausea.      Call your surgeon if    · You feel too sleepy, dizzy, or groggy (medication may be too strong).  · You have side effects like nausea, vomiting, or skin changes (rash, itching, or hives).   © 2467-3367 [employer]. [address] [place] [address]. All rights reserved. This information is not intended as a substitute for professional medical care. Always follow your healthcare professional's instructions.        Home Care Instruction D&C Hysteroscopy             ACTIVITY LEVEL:  If you received sedation and/or an anesthetic, you may feel sleepy for several hours. Rest until you feel more  awake. Gradually resume your normal activities.    DIET:  At home, continue with liquids. If there is no nausea, you may eat a soft diet and gradually resume a regular diet.    BATHING:  You may shower  as desired in one day.  You should avoid tub baths, hot tubs and swimming pools until seen by your physician for a post-op follow up.    CARE:  You can expect watery or bloody vaginal discharge for several days. Do not use tampons, please only use pads. Sexual activity is restricted as advised by your doctor.    MEDICATIONS:  You will receive instructions for any pain and/or antibiotic prescriptions. Pain medication should be taken only if  needed and as directed. Antibiotics, if ordered, should be taken as directed until the entire prescription is completed.    ADDITIONAL INFORMATION:  __________________________________________________________________________________________  WHEN TO CALL THE DOCTOR:   For any heavy vaginal bleeding   Fever over 101°F (38.4°C)   Any lower abdominal pain not relieved by the pain mediation  RETURN APPOINTMENT:  __________________________________________________________________________________________      FOLLOW ANY OTHER INSTRUCTIONS GIVEN TO YOU BY DR FLETCHER

## 2020-12-08 NOTE — TRANSFER OF CARE
"Anesthesia Transfer of Care Note    Patient: Mable Philip    Procedure(s) Performed: Procedure(s) (LRB):  HYSTEROSCOPY (N/A)    Patient location: PACU    Anesthesia Type: general    Transport from OR: Transported from OR on room air with adequate spontaneous ventilation    Post pain: adequate analgesia    Post assessment: no apparent anesthetic complications    Post vital signs: stable    Level of consciousness: awake    Nausea/Vomiting: no nausea/vomiting    Complications: none    Transfer of care protocol was followed      Last vitals:   Visit Vitals  /76   Pulse 100   Temp 36.7 °C (98 °F) (Oral)   Resp 18   Ht 5' 5" (1.651 m)   Wt 63.5 kg (140 lb)   LMP 11/27/2020   SpO2 100%   Breastfeeding No   BMI 23.30 kg/m²     "

## 2020-12-08 NOTE — ANESTHESIA PROCEDURE NOTES
Intubation  Performed by: Renetta Valentine CRNA  Authorized by: Michael Vega MD     Intubation:     Induction:  Intravenous    Intubated:  Postinduction    Mask Ventilation:  Easy mask    Attempts:  1    Attempted By:  CRNA    Difficult Airway Encountered?: No      Complications:  None    Airway Device:  Supraglottic airway/LMA    Airway Device Size:  3.5    Style/Cuff Inflation:  Cuffed (inflated to minimal occlusive pressure)    Placement Verified By:  Capnometry    Complicating Factors:  None    Findings Post-Intubation:  BS equal bilateral and atraumatic/condition of teeth unchanged

## 2020-12-08 NOTE — INTERVAL H&P NOTE
The patient has been examined and the H&P has been reviewed:    I concur with the findings and no changes have occurred since H&P was written.    Surgery risks, benefits and alternative options discussed and understood by patient/family.          Active Hospital Problems    Diagnosis  POA    Retained products of conception after miscarriage [O03.4]  Yes      Resolved Hospital Problems   No resolved problems to display.

## 2020-12-08 NOTE — ANESTHESIA POSTPROCEDURE EVALUATION
Anesthesia Post Evaluation    Patient: Mable Philip    Procedure(s) Performed: Procedure(s) (LRB):  HYSTEROSCOPY (N/A)    Final Anesthesia Type: general    Patient location during evaluation: PACU  Patient participation: Yes- Able to Participate  Level of consciousness: awake and alert and oriented  Post-procedure vital signs: reviewed and stable  Pain management: adequate  Airway patency: patent    PONV status at discharge: No PONV  Anesthetic complications: no      Cardiovascular status: blood pressure returned to baseline and hemodynamically stable  Respiratory status: unassisted, spontaneous ventilation and room air  Hydration status: euvolemic  Follow-up not needed.          Vitals Value Taken Time   /64 12/08/20 1403   Temp 36.8 °C (98.3 °F) 12/08/20 1333   Pulse 86 12/08/20 1403   Resp 16 12/08/20 1403   SpO2 99 % 12/08/20 1403         Event Time   Out of Recovery 13:29:04         Pain/Nova Score: Pain Rating Prior to Med Admin: 4 (12/8/2020 12:54 PM)  Pain Rating Post Med Admin: 3 (12/8/2020  1:18 PM)  Nova Score: 10 (12/8/2020  2:03 PM)

## 2020-12-08 NOTE — DISCHARGE SUMMARY
[facility], [place]  Discharge Summary  OBGYN    Admission date: 12/8/2020  Discharge date: 12/08/2020    Admit Dx:      Patient Active Problem List   Diagnosis    Oligomenorrhea    Gestational hypertension affecting first pregnancy    Retained products of conception after miscarriage     Discharge Dx:    Patient Active Problem List   Diagnosis    Oligomenorrhea    Gestational hypertension affecting first pregnancy    Retained products of conception after miscarriage       Attending Physician: Adenike Mora   Discharge Provider: Adenike Mora    Procedures Performed: Procedure(s) (LRB):  HYSTEROSCOPY (N/A)    Hospital Course: Patient was admitted on 12/8/2020 to undergo operative hysteroscopy secondary to retained products after SAB.  Procedure was uncomplicated, for full details see operative report. Barring any unforseen incidents, patient will be discharged home when she is able to ambulate, void, and tolerate PO intake.    Consults: none    Significant Diagnostic Studies:   Lab Results   Component Value Date    WBC 14.32 (H) 03/11/2018    HGB 9.2 (L) 03/11/2018    HCT 27.8 (L) 03/11/2018    MCV 89 03/11/2018     03/11/2018     US Pelvis Comp with Transvag NON-OB (xpd  Narrative: EXAMINATION:  US PELVIS COMP WITH TRANSVAG NON-OB (XPD)    CLINICAL HISTORY:  Abnormal uterine and vaginal bleeding, unspecified    TECHNIQUE:  Transabdominal sonography of the pelvis was performed, followed by transvaginal sonography to better evaluate the uterus and ovaries.    COMPARISON:  None.    FINDINGS:  The uterus is normal in size and measures 6.2 x 3.5 x 4.8 cm.  The endometrial stripe is in general thin measuring 4 mm.  There is a focal area of heterogeneity within the endometrial canal along the right fundal region.  In this region the endometrial thickness is 11 mm.  Overall this area of heterogeneity measures 1.8 x 1.8 x 1.1 cm.  There is no increased vascularity internally.    The  ovaries are normal in size and appearance.    The right ovary measures 2.4 x 1.7 x 3.9 cm.   The left ovary measures 3.3 x 2.1 x 3.1cm.  Trace fluid present in the pelvis.  There is appropriate flow in the ovaries.  Impression: In this patient with history of abnormal bleeding, there is a focal area of heterogeneity and relative thickening of the endometrium, within the right fundus of the uterus.  While this may represent blood products, correlation with any additional relevant clinical findings recommended.  Consider further evaluation versus ultrasound follow-up.    This report was flagged in Epic as abnormal.    Electronically signed by: Angie Velasquez MD  Date:    11/30/2020  Time:    13:11      Discharged Condition: stable    Disposition: Home    Follow Up:       Medications:  Current Discharge Medication List      START taking these medications    Details   ibuprofen (ADVIL,MOTRIN) 800 MG tablet Take 1 tablet (800 mg total) by mouth every 8 (eight) hours as needed for Pain.  Qty: 20 tablet, Refills: 0      oxyCODONE-acetaminophen (PERCOCET) 5-325 mg per tablet Take 1 tablet by mouth every 4 (four) hours as needed for Pain.  Qty: 5 tablet, Refills: 0    Comments: Quantity prescribed more than 7 day supply? No         CONTINUE these medications which have NOT CHANGED    Details   buPROPion (WELLBUTRIN XL) 300 MG 24 hr tablet       clonazePAM (KLONOPIN) 1 MG tablet       escitalopram oxalate (LEXAPRO) 20 MG tablet              Discharge Procedure Orders   Diet general     Pelvic Rest   Order Comments: X 2 weeks     Call MD for:  temperature >100.4     Call MD for:  severe uncontrolled pain     Call MD for:   Order Comments: Vaginal bleeding >2pads/hour for >2 hours

## 2020-12-08 NOTE — OP NOTE
[facility]  Operative Note    SUMMARY     Date of Procedure: 2020     Procedure: Procedure(s) (LRB):  HYSTEROSCOPY (N/A)       Surgeon: Adenike Mora M.D.    Assisting Surgeon: None    Pre-Operative Diagnosis: Retained products of conception after miscarriage    Post-Operative Diagnosis: Post-Op Diagnosis Codes:     * Abnormal uterine bleeding (AUB) [N93.9]  2. Retained products of conception after miscarriage    Anesthesia: General    Technical Procedures Used: Hysteroscopic resection of retained products of conception after miscarriage.     Indications:   35 y.o.  had a miscarriage dx'd in 2020. No intervention needed and patients bleeding resolved and hcg levels returned to zero. In 2020 patient had an episode of sudden heavy bleeding. U/S revealed what appeared to be calcified retained POC at the uterine fundus.     Description of the Findings of the Procedure: Patient was taken to the operating room where general anesthesia was performed and found to be adequate. She was prepped and draped in the normal sterile fashion in the dorsal lithotomy position in [facility]. Bladder was drained with straight catheter. Weighted speculum placed in the posterior vagina and the anterior vagina was elevated with terry retractor. The anterior lip of the cervix was grasped with the single tooth tenaculum. The uterus was then sounded and measured 9 cm in length. The dilators were used to dilate the cervix enough to allow introduction of the hysteroscope. The hysteroscope was introduced and the retained products were visualized in front of the right tubal ostia. Myosture Lite was then used to remove all abnormal tissue from the uterine cavity. The uterine cavity was noted to be free of other anomalies. Bilateral tubal ostia were visualized.  The uterine cavity had an abnormal appearance to it, c/w bicornuate. All instruments were then removed from the vagina. The patient  tolerated procedure well. Sponge lap and needle counts were correct x 2.    Complications: No    Estimated Blood Loss (EBL): * No values recorded between 12/8/2020 12:21 PM and 12/8/2020 12:34 PM *           Specimens:   Specimen (12h ago, onward)    None                  Condition: Good    Disposition: PACU - hemodynamically stable.    Attestation: There was no qualified resident available for this procedure.

## 2020-12-08 NOTE — PLAN OF CARE
Mable Philip has met all discharge criteria from Phase II. Vital Signs are stable, ambulating  without difficulty.Pain is now under control and tolerable for the pt. Pain score 2 at this time.  Discharge instructions given, patient verbalized understanding. Discharged from facility via wheelchair in stable condition.

## 2020-12-14 ENCOUNTER — PATIENT MESSAGE (OUTPATIENT)
Dept: OBSTETRICS AND GYNECOLOGY | Facility: CLINIC | Age: 35
End: 2020-12-14

## 2020-12-14 NOTE — TELEPHONE ENCOUNTER
Pt is scheduled with you tomorrow for f/u periods.   Do you want me to cancel that and reschedule for her 2 weeks post op or keep the appt tomorrow also?

## 2020-12-15 ENCOUNTER — OFFICE VISIT (OUTPATIENT)
Dept: OBSTETRICS AND GYNECOLOGY | Facility: CLINIC | Age: 35
End: 2020-12-15
Payer: COMMERCIAL

## 2020-12-15 VITALS
DIASTOLIC BLOOD PRESSURE: 66 MMHG | HEIGHT: 65 IN | WEIGHT: 146.25 LBS | BODY MASS INDEX: 24.37 KG/M2 | SYSTOLIC BLOOD PRESSURE: 100 MMHG

## 2020-12-15 DIAGNOSIS — Q51.3 BICORNUATE UTERUS: ICD-10-CM

## 2020-12-15 DIAGNOSIS — N97.0 ANOVULATION: ICD-10-CM

## 2020-12-15 DIAGNOSIS — Z98.890 POST-OPERATIVE STATE: Primary | ICD-10-CM

## 2020-12-15 PROCEDURE — 99024 POSTOP FOLLOW-UP VISIT: CPT | Mod: S$GLB,,, | Performed by: OBSTETRICS & GYNECOLOGY

## 2020-12-15 PROCEDURE — 1126F AMNT PAIN NOTED NONE PRSNT: CPT | Mod: S$GLB,,, | Performed by: OBSTETRICS & GYNECOLOGY

## 2020-12-15 PROCEDURE — 3008F PR BODY MASS INDEX (BMI) DOCUMENTED: ICD-10-PCS | Mod: CPTII,S$GLB,, | Performed by: OBSTETRICS & GYNECOLOGY

## 2020-12-15 PROCEDURE — 99999 PR PBB SHADOW E&M-EST. PATIENT-LVL III: CPT | Mod: PBBFAC,,, | Performed by: OBSTETRICS & GYNECOLOGY

## 2020-12-15 PROCEDURE — 99999 PR PBB SHADOW E&M-EST. PATIENT-LVL III: ICD-10-PCS | Mod: PBBFAC,,, | Performed by: OBSTETRICS & GYNECOLOGY

## 2020-12-15 PROCEDURE — 1126F PR PAIN SEVERITY QUANTIFIED, NO PAIN PRESENT: ICD-10-PCS | Mod: S$GLB,,, | Performed by: OBSTETRICS & GYNECOLOGY

## 2020-12-15 PROCEDURE — 99024 PR POST-OP FOLLOW-UP VISIT: ICD-10-PCS | Mod: S$GLB,,, | Performed by: OBSTETRICS & GYNECOLOGY

## 2020-12-15 PROCEDURE — 3008F BODY MASS INDEX DOCD: CPT | Mod: CPTII,S$GLB,, | Performed by: OBSTETRICS & GYNECOLOGY

## 2020-12-15 RX ORDER — CLOMIPHENE CITRATE 50 MG/1
100 TABLET ORAL DAILY
Qty: 10 TABLET | Refills: 3 | Status: SHIPPED | OUTPATIENT
Start: 2020-12-15 | End: 2021-03-14

## 2020-12-15 NOTE — PROGRESS NOTES
"  Subjective:       Mable Philip is a 35 y.o. [id number] presents to the clinic 1 week status post operative hysteroscopy for retained POC after miscarriage in .. Eating a regular diet without difficulty. Bowel movements are normal. The patient is not having any pain. Her vaginal bleeding is resolved.    The patient's allergies, and past medical and surgical histories were reviewed.    Review of Systems  Pertinent items are noted in HPI.      Objective:      /66   Ht 5' 5" (1.651 m)   Wt 66.3 kg (146 lb 4.4 oz)   LMP 2020   BMI 24.34 kg/m²   General:  alert and cooperative   Abdomen: soft, non-tender      Pathology:  Pending       Assessment:     1. 35 y.o.  s/p hysteroscopic resection of retained POC after miscarriage - 1 week post op -  Doing well     Plan:      1.  Activity restrictions: pelvic rest for 1 more week.    2. Continue PNV   3. RTC PRN pregnancy.   4. Refills on Clomid 100 mg sent in today.  "

## 2020-12-16 LAB
FINAL PATHOLOGIC DIAGNOSIS: NORMAL
GROSS: NORMAL
Lab: NORMAL
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
## 2021-05-19 ENCOUNTER — OFFICE VISIT (OUTPATIENT)
Dept: OBSTETRICS AND GYNECOLOGY | Facility: CLINIC | Age: 36
End: 2021-05-19
Attending: OBSTETRICS & GYNECOLOGY
Payer: COMMERCIAL

## 2021-05-19 VITALS
SYSTOLIC BLOOD PRESSURE: 98 MMHG | BODY MASS INDEX: 22.27 KG/M2 | DIASTOLIC BLOOD PRESSURE: 60 MMHG | HEIGHT: 65 IN | WEIGHT: 133.69 LBS

## 2021-05-19 DIAGNOSIS — Z11.3 SCREENING FOR STD (SEXUALLY TRANSMITTED DISEASE): ICD-10-CM

## 2021-05-19 DIAGNOSIS — Z01.419 ENCOUNTER FOR ANNUAL ROUTINE GYNECOLOGICAL EXAMINATION: Primary | ICD-10-CM

## 2021-05-19 DIAGNOSIS — N97.0 ANOVULATION: ICD-10-CM

## 2021-05-19 PROCEDURE — 99395 PR PREVENTIVE VISIT,EST,18-39: ICD-10-PCS | Mod: S$GLB,,, | Performed by: OBSTETRICS & GYNECOLOGY

## 2021-05-19 PROCEDURE — 1126F PR PAIN SEVERITY QUANTIFIED, NO PAIN PRESENT: ICD-10-PCS | Mod: S$GLB,,, | Performed by: OBSTETRICS & GYNECOLOGY

## 2021-05-19 PROCEDURE — 3008F PR BODY MASS INDEX (BMI) DOCUMENTED: ICD-10-PCS | Mod: CPTII,S$GLB,, | Performed by: OBSTETRICS & GYNECOLOGY

## 2021-05-19 PROCEDURE — 3008F BODY MASS INDEX DOCD: CPT | Mod: CPTII,S$GLB,, | Performed by: OBSTETRICS & GYNECOLOGY

## 2021-05-19 PROCEDURE — 99999 PR PBB SHADOW E&M-EST. PATIENT-LVL III: ICD-10-PCS | Mod: PBBFAC,,, | Performed by: OBSTETRICS & GYNECOLOGY

## 2021-05-19 PROCEDURE — 1126F AMNT PAIN NOTED NONE PRSNT: CPT | Mod: S$GLB,,, | Performed by: OBSTETRICS & GYNECOLOGY

## 2021-05-19 PROCEDURE — 99999 PR PBB SHADOW E&M-EST. PATIENT-LVL III: CPT | Mod: PBBFAC,,, | Performed by: OBSTETRICS & GYNECOLOGY

## 2021-05-19 PROCEDURE — 99395 PREV VISIT EST AGE 18-39: CPT | Mod: S$GLB,,, | Performed by: OBSTETRICS & GYNECOLOGY

## 2021-05-20 LAB
C TRACH RRNA SPEC QL NAA+PROBE: NEGATIVE
N GONORRHOEA RRNA SPEC QL NAA+PROBE: NEGATIVE

## 2021-05-24 ENCOUNTER — LAB VISIT (OUTPATIENT)
Dept: LAB | Facility: OTHER | Age: 36
End: 2021-05-24
Payer: COMMERCIAL

## 2021-05-24 DIAGNOSIS — N97.0 ANOVULATION: ICD-10-CM

## 2021-05-24 LAB — PROGEST SERPL-MCNC: 13.9 NG/ML

## 2021-05-24 PROCEDURE — 84144 ASSAY OF PROGESTERONE: CPT | Performed by: OBSTETRICS & GYNECOLOGY

## 2021-05-24 PROCEDURE — 36415 COLL VENOUS BLD VENIPUNCTURE: CPT | Performed by: OBSTETRICS & GYNECOLOGY

## 2021-07-12 ENCOUNTER — PATIENT MESSAGE (OUTPATIENT)
Dept: OBSTETRICS AND GYNECOLOGY | Facility: CLINIC | Age: 36
End: 2021-07-12

## 2021-07-12 DIAGNOSIS — Z32.01 POSITIVE URINE PREGNANCY TEST: Primary | ICD-10-CM

## 2021-07-13 ENCOUNTER — LAB VISIT (OUTPATIENT)
Dept: LAB | Facility: OTHER | Age: 36
End: 2021-07-13
Attending: OBSTETRICS & GYNECOLOGY
Payer: COMMERCIAL

## 2021-07-13 DIAGNOSIS — Z32.01 POSITIVE URINE PREGNANCY TEST: ICD-10-CM

## 2021-07-13 LAB
HCG INTACT+B SERPL-ACNC: 289 MIU/ML
PROGEST SERPL-MCNC: 13.1 NG/ML

## 2021-07-13 PROCEDURE — 84144 ASSAY OF PROGESTERONE: CPT | Performed by: OBSTETRICS & GYNECOLOGY

## 2021-07-13 PROCEDURE — 36415 COLL VENOUS BLD VENIPUNCTURE: CPT | Performed by: OBSTETRICS & GYNECOLOGY

## 2021-07-13 PROCEDURE — 84702 CHORIONIC GONADOTROPIN TEST: CPT | Performed by: OBSTETRICS & GYNECOLOGY

## 2021-07-16 ENCOUNTER — LAB VISIT (OUTPATIENT)
Dept: LAB | Facility: OTHER | Age: 36
End: 2021-07-16
Attending: OBSTETRICS & GYNECOLOGY
Payer: COMMERCIAL

## 2021-07-16 ENCOUNTER — PATIENT MESSAGE (OUTPATIENT)
Dept: OBSTETRICS AND GYNECOLOGY | Facility: CLINIC | Age: 36
End: 2021-07-16

## 2021-07-16 DIAGNOSIS — Z32.01 POSITIVE URINE PREGNANCY TEST: ICD-10-CM

## 2021-07-16 LAB — HCG INTACT+B SERPL-ACNC: 908 MIU/ML

## 2021-07-16 PROCEDURE — 36415 COLL VENOUS BLD VENIPUNCTURE: CPT | Performed by: OBSTETRICS & GYNECOLOGY

## 2021-07-16 PROCEDURE — 84702 CHORIONIC GONADOTROPIN TEST: CPT | Performed by: OBSTETRICS & GYNECOLOGY

## 2021-07-19 ENCOUNTER — TELEPHONE (OUTPATIENT)
Dept: OBSTETRICS AND GYNECOLOGY | Facility: CLINIC | Age: 36
End: 2021-07-19

## 2021-07-19 DIAGNOSIS — Z34.90 EARLY STAGE OF PREGNANCY: Primary | ICD-10-CM

## 2021-07-19 DIAGNOSIS — N92.6 MISSED MENSES: Primary | ICD-10-CM

## 2021-07-20 ENCOUNTER — LAB VISIT (OUTPATIENT)
Dept: LAB | Facility: OTHER | Age: 36
End: 2021-07-20
Attending: OBSTETRICS & GYNECOLOGY
Payer: COMMERCIAL

## 2021-07-20 DIAGNOSIS — Z34.90 EARLY STAGE OF PREGNANCY: ICD-10-CM

## 2021-07-20 LAB — HCG INTACT+B SERPL-ACNC: 4532 MIU/ML

## 2021-07-20 PROCEDURE — 84702 CHORIONIC GONADOTROPIN TEST: CPT | Performed by: OBSTETRICS & GYNECOLOGY

## 2021-07-20 PROCEDURE — 36415 COLL VENOUS BLD VENIPUNCTURE: CPT | Performed by: OBSTETRICS & GYNECOLOGY

## 2021-08-04 PROBLEM — O34.00 BICORNUATE UTERUS AFFECTING PREGNANCY, ANTEPARTUM: Status: ACTIVE | Noted: 2021-08-04

## 2021-08-04 PROBLEM — O09.529 ANTEPARTUM MULTIGRAVIDA OF ADVANCED MATERNAL AGE: Status: ACTIVE | Noted: 2021-08-04

## 2021-08-04 PROBLEM — Z87.59 HISTORY OF GESTATIONAL HYPERTENSION: Status: ACTIVE | Noted: 2021-08-04

## 2021-08-04 PROBLEM — Q51.3 BICORNUATE UTERUS AFFECTING PREGNANCY, ANTEPARTUM: Status: ACTIVE | Noted: 2021-08-04

## 2021-08-09 ENCOUNTER — PATIENT MESSAGE (OUTPATIENT)
Dept: OBSTETRICS AND GYNECOLOGY | Facility: CLINIC | Age: 36
End: 2021-08-09

## 2021-08-10 ENCOUNTER — PROCEDURE VISIT (OUTPATIENT)
Dept: OBSTETRICS AND GYNECOLOGY | Facility: CLINIC | Age: 36
End: 2021-08-10
Payer: COMMERCIAL

## 2021-08-10 ENCOUNTER — INITIAL PRENATAL (OUTPATIENT)
Dept: OBSTETRICS AND GYNECOLOGY | Facility: CLINIC | Age: 36
End: 2021-08-10
Payer: COMMERCIAL

## 2021-08-10 VITALS — DIASTOLIC BLOOD PRESSURE: 68 MMHG | SYSTOLIC BLOOD PRESSURE: 112 MMHG | BODY MASS INDEX: 21.63 KG/M2 | WEIGHT: 130 LBS

## 2021-08-10 DIAGNOSIS — O09.529 ANTEPARTUM MULTIGRAVIDA OF ADVANCED MATERNAL AGE: ICD-10-CM

## 2021-08-10 DIAGNOSIS — O34.00 BICORNUATE UTERUS AFFECTING PREGNANCY, ANTEPARTUM: ICD-10-CM

## 2021-08-10 DIAGNOSIS — Z87.59 HISTORY OF GESTATIONAL HYPERTENSION: ICD-10-CM

## 2021-08-10 DIAGNOSIS — N92.6 MISSED MENSES: ICD-10-CM

## 2021-08-10 DIAGNOSIS — Q51.3 BICORNUATE UTERUS AFFECTING PREGNANCY, ANTEPARTUM: ICD-10-CM

## 2021-08-10 DIAGNOSIS — Z36.89 ESTABLISH GESTATIONAL AGE, ULTRASOUND: ICD-10-CM

## 2021-08-10 DIAGNOSIS — Z36.9 ENCOUNTER FOR ANTENATAL SCREENING, UNSPECIFIED: Primary | ICD-10-CM

## 2021-08-10 PROCEDURE — 99213 PR OFFICE/OUTPT VISIT, EST, LEVL III, 20-29 MIN: ICD-10-PCS | Mod: S$GLB,,, | Performed by: OBSTETRICS & GYNECOLOGY

## 2021-08-10 PROCEDURE — 99999 PR PBB SHADOW E&M-EST. PATIENT-LVL II: ICD-10-PCS | Mod: PBBFAC,,, | Performed by: OBSTETRICS & GYNECOLOGY

## 2021-08-10 PROCEDURE — 76801 OB US < 14 WKS SINGLE FETUS: CPT | Mod: S$GLB,,, | Performed by: OBSTETRICS & GYNECOLOGY

## 2021-08-10 PROCEDURE — 99999 PR PBB SHADOW E&M-EST. PATIENT-LVL II: CPT | Mod: PBBFAC,,, | Performed by: OBSTETRICS & GYNECOLOGY

## 2021-08-10 PROCEDURE — 99213 OFFICE O/P EST LOW 20 MIN: CPT | Mod: S$GLB,,, | Performed by: OBSTETRICS & GYNECOLOGY

## 2021-08-10 PROCEDURE — 76801 PR US, OB <14WKS, TRANSABD, SINGLE GESTATION: ICD-10-PCS | Mod: S$GLB,,, | Performed by: OBSTETRICS & GYNECOLOGY

## 2021-08-11 ENCOUNTER — PATIENT MESSAGE (OUTPATIENT)
Dept: OBSTETRICS AND GYNECOLOGY | Facility: CLINIC | Age: 36
End: 2021-08-11

## 2021-08-11 RX ORDER — ONDANSETRON 4 MG/1
4 TABLET, FILM COATED ORAL DAILY PRN
Qty: 30 TABLET | Refills: 1 | Status: SHIPPED | OUTPATIENT
Start: 2021-08-11 | End: 2021-12-01

## 2021-09-06 ENCOUNTER — PATIENT MESSAGE (OUTPATIENT)
Dept: ADMINISTRATIVE | Facility: OTHER | Age: 36
End: 2021-09-06

## 2021-09-13 ENCOUNTER — PATIENT MESSAGE (OUTPATIENT)
Dept: ADMINISTRATIVE | Facility: OTHER | Age: 36
End: 2021-09-13

## 2021-09-14 ENCOUNTER — TELEPHONE (OUTPATIENT)
Dept: OBSTETRICS AND GYNECOLOGY | Facility: CLINIC | Age: 36
End: 2021-09-14

## 2021-09-14 DIAGNOSIS — O09.529 ANTEPARTUM MULTIGRAVIDA OF ADVANCED MATERNAL AGE: Primary | ICD-10-CM

## 2021-09-15 ENCOUNTER — PATIENT MESSAGE (OUTPATIENT)
Dept: ADMINISTRATIVE | Facility: OTHER | Age: 36
End: 2021-09-15

## 2021-09-15 ENCOUNTER — ROUTINE PRENATAL (OUTPATIENT)
Dept: OBSTETRICS AND GYNECOLOGY | Facility: CLINIC | Age: 36
End: 2021-09-15
Payer: COMMERCIAL

## 2021-09-15 ENCOUNTER — LAB VISIT (OUTPATIENT)
Dept: LAB | Facility: OTHER | Age: 36
End: 2021-09-15
Attending: OBSTETRICS & GYNECOLOGY
Payer: COMMERCIAL

## 2021-09-15 VITALS
BODY MASS INDEX: 24.58 KG/M2 | DIASTOLIC BLOOD PRESSURE: 70 MMHG | WEIGHT: 147.69 LBS | SYSTOLIC BLOOD PRESSURE: 114 MMHG

## 2021-09-15 DIAGNOSIS — Z3A.13 13 WEEKS GESTATION OF PREGNANCY: ICD-10-CM

## 2021-09-15 DIAGNOSIS — Q51.3 BICORNUATE UTERUS AFFECTING PREGNANCY, ANTEPARTUM: ICD-10-CM

## 2021-09-15 DIAGNOSIS — O34.00 BICORNUATE UTERUS AFFECTING PREGNANCY, ANTEPARTUM: ICD-10-CM

## 2021-09-15 DIAGNOSIS — Z87.59 HISTORY OF GESTATIONAL HYPERTENSION: Primary | ICD-10-CM

## 2021-09-15 DIAGNOSIS — O09.529 ANTEPARTUM MULTIGRAVIDA OF ADVANCED MATERNAL AGE: ICD-10-CM

## 2021-09-15 LAB
ABO + RH BLD: NORMAL
BASOPHILS # BLD AUTO: 0.03 K/UL (ref 0–0.2)
BASOPHILS NFR BLD: 0.3 % (ref 0–1.9)
BLD GP AB SCN CELLS X3 SERPL QL: NORMAL
DIFFERENTIAL METHOD: ABNORMAL
EOSINOPHIL # BLD AUTO: 0 K/UL (ref 0–0.5)
EOSINOPHIL NFR BLD: 0.3 % (ref 0–8)
ERYTHROCYTE [DISTWIDTH] IN BLOOD BY AUTOMATED COUNT: 13.6 % (ref 11.5–14.5)
HCT VFR BLD AUTO: 38.7 % (ref 37–48.5)
HGB BLD-MCNC: 13.1 G/DL (ref 12–16)
IMM GRANULOCYTES # BLD AUTO: 0.03 K/UL (ref 0–0.04)
IMM GRANULOCYTES NFR BLD AUTO: 0.3 % (ref 0–0.5)
LYMPHOCYTES # BLD AUTO: 2.3 K/UL (ref 1–4.8)
LYMPHOCYTES NFR BLD: 20.5 % (ref 18–48)
MCH RBC QN AUTO: 30.8 PG (ref 27–31)
MCHC RBC AUTO-ENTMCNC: 33.9 G/DL (ref 32–36)
MCV RBC AUTO: 91 FL (ref 82–98)
MONOCYTES # BLD AUTO: 0.6 K/UL (ref 0.3–1)
MONOCYTES NFR BLD: 5.6 % (ref 4–15)
NEUTROPHILS # BLD AUTO: 8.2 K/UL (ref 1.8–7.7)
NEUTROPHILS NFR BLD: 73 % (ref 38–73)
NRBC BLD-RTO: 0 /100 WBC
PLATELET # BLD AUTO: 364 K/UL (ref 150–450)
PMV BLD AUTO: 10.3 FL (ref 9.2–12.9)
RBC # BLD AUTO: 4.25 M/UL (ref 4–5.4)
WBC # BLD AUTO: 11.25 K/UL (ref 3.9–12.7)

## 2021-09-15 PROCEDURE — 0500F PR INITIAL PRENATAL CARE VISIT: ICD-10-PCS | Mod: CPTII,S$GLB,, | Performed by: OBSTETRICS & GYNECOLOGY

## 2021-09-15 PROCEDURE — 87389 HIV-1 AG W/HIV-1&-2 AB AG IA: CPT | Performed by: OBSTETRICS & GYNECOLOGY

## 2021-09-15 PROCEDURE — 36415 COLL VENOUS BLD VENIPUNCTURE: CPT | Performed by: OBSTETRICS & GYNECOLOGY

## 2021-09-15 PROCEDURE — 86900 BLOOD TYPING SEROLOGIC ABO: CPT | Performed by: OBSTETRICS & GYNECOLOGY

## 2021-09-15 PROCEDURE — 99999 PR PBB SHADOW E&M-EST. PATIENT-LVL II: CPT | Mod: PBBFAC,,, | Performed by: OBSTETRICS & GYNECOLOGY

## 2021-09-15 PROCEDURE — 85025 COMPLETE CBC W/AUTO DIFF WBC: CPT | Performed by: OBSTETRICS & GYNECOLOGY

## 2021-09-15 PROCEDURE — 0500F INITIAL PRENATAL CARE VISIT: CPT | Mod: CPTII,S$GLB,, | Performed by: OBSTETRICS & GYNECOLOGY

## 2021-09-15 PROCEDURE — 86762 RUBELLA ANTIBODY: CPT | Performed by: OBSTETRICS & GYNECOLOGY

## 2021-09-15 PROCEDURE — 86592 SYPHILIS TEST NON-TREP QUAL: CPT | Performed by: OBSTETRICS & GYNECOLOGY

## 2021-09-15 PROCEDURE — 99999 PR PBB SHADOW E&M-EST. PATIENT-LVL II: ICD-10-PCS | Mod: PBBFAC,,, | Performed by: OBSTETRICS & GYNECOLOGY

## 2021-09-15 PROCEDURE — 87340 HEPATITIS B SURFACE AG IA: CPT | Performed by: OBSTETRICS & GYNECOLOGY

## 2021-09-16 LAB
HBV SURFACE AG SERPL QL IA: NEGATIVE
HIV 1+2 AB+HIV1 P24 AG SERPL QL IA: NEGATIVE
RPR SER QL: NORMAL
RUBV IGG SER-ACNC: 69.9 IU/ML
RUBV IGG SER-IMP: REACTIVE

## 2021-09-17 ENCOUNTER — PATIENT MESSAGE (OUTPATIENT)
Dept: MATERNAL FETAL MEDICINE | Facility: CLINIC | Age: 36
End: 2021-09-17

## 2021-09-24 ENCOUNTER — PATIENT MESSAGE (OUTPATIENT)
Dept: OBSTETRICS AND GYNECOLOGY | Facility: CLINIC | Age: 36
End: 2021-09-24

## 2021-10-06 ENCOUNTER — ROUTINE PRENATAL (OUTPATIENT)
Dept: OBSTETRICS AND GYNECOLOGY | Facility: CLINIC | Age: 36
End: 2021-10-06
Payer: COMMERCIAL

## 2021-10-06 VITALS
SYSTOLIC BLOOD PRESSURE: 114 MMHG | WEIGHT: 150.38 LBS | DIASTOLIC BLOOD PRESSURE: 66 MMHG | BODY MASS INDEX: 25.02 KG/M2

## 2021-10-06 DIAGNOSIS — Z34.92 NORMAL PREGNANCY, SECOND TRIMESTER: Primary | ICD-10-CM

## 2021-10-06 PROCEDURE — 99999 PR PBB SHADOW E&M-EST. PATIENT-LVL III: ICD-10-PCS | Mod: PBBFAC,,, | Performed by: OBSTETRICS & GYNECOLOGY

## 2021-10-06 PROCEDURE — 90471 IMMUNIZATION ADMIN: CPT | Mod: S$GLB,,, | Performed by: OBSTETRICS & GYNECOLOGY

## 2021-10-06 PROCEDURE — 0502F SUBSEQUENT PRENATAL CARE: CPT | Mod: CPTII,S$GLB,, | Performed by: OBSTETRICS & GYNECOLOGY

## 2021-10-06 PROCEDURE — 90686 FLU VACCINE (QUAD) GREATER THAN OR EQUAL TO 3YO PRESERVATIVE FREE IM: ICD-10-PCS | Mod: S$GLB,,, | Performed by: OBSTETRICS & GYNECOLOGY

## 2021-10-06 PROCEDURE — 99999 PR PBB SHADOW E&M-EST. PATIENT-LVL III: CPT | Mod: PBBFAC,,, | Performed by: OBSTETRICS & GYNECOLOGY

## 2021-10-06 PROCEDURE — 90471 FLU VACCINE (QUAD) GREATER THAN OR EQUAL TO 3YO PRESERVATIVE FREE IM: ICD-10-PCS | Mod: S$GLB,,, | Performed by: OBSTETRICS & GYNECOLOGY

## 2021-10-06 PROCEDURE — 90686 IIV4 VACC NO PRSV 0.5 ML IM: CPT | Mod: S$GLB,,, | Performed by: OBSTETRICS & GYNECOLOGY

## 2021-10-06 PROCEDURE — 0502F PR SUBSEQUENT PRENATAL CARE: ICD-10-PCS | Mod: CPTII,S$GLB,, | Performed by: OBSTETRICS & GYNECOLOGY

## 2021-10-06 RX ORDER — ESCITALOPRAM OXALATE 10 MG/1
10 TABLET ORAL DAILY
COMMUNITY

## 2021-10-29 ENCOUNTER — PATIENT MESSAGE (OUTPATIENT)
Dept: MATERNAL FETAL MEDICINE | Facility: CLINIC | Age: 36
End: 2021-10-29
Payer: COMMERCIAL

## 2021-11-01 ENCOUNTER — PROCEDURE VISIT (OUTPATIENT)
Dept: MATERNAL FETAL MEDICINE | Facility: CLINIC | Age: 36
End: 2021-11-01
Payer: COMMERCIAL

## 2021-11-01 DIAGNOSIS — Z36.89 ENCOUNTER FOR ULTRASOUND TO ASSESS FETAL GROWTH: Primary | ICD-10-CM

## 2021-11-01 DIAGNOSIS — O34.00 BICORNUATE UTERUS AFFECTING PREGNANCY, ANTEPARTUM: ICD-10-CM

## 2021-11-01 DIAGNOSIS — O09.529 ANTEPARTUM MULTIGRAVIDA OF ADVANCED MATERNAL AGE: ICD-10-CM

## 2021-11-01 DIAGNOSIS — Q51.3 BICORNUATE UTERUS AFFECTING PREGNANCY, ANTEPARTUM: ICD-10-CM

## 2021-11-01 PROCEDURE — 76817 PR US, OB, TRANSVAG APPROACH: ICD-10-PCS | Mod: S$GLB,,, | Performed by: OBSTETRICS & GYNECOLOGY

## 2021-11-01 PROCEDURE — 76817 TRANSVAGINAL US OBSTETRIC: CPT | Mod: S$GLB,,, | Performed by: OBSTETRICS & GYNECOLOGY

## 2021-11-01 PROCEDURE — 76811 OB US DETAILED SNGL FETUS: CPT | Mod: S$GLB,,, | Performed by: OBSTETRICS & GYNECOLOGY

## 2021-11-01 PROCEDURE — 76811 PR US, OB FETAL EVAL & EXAM, TRANSABDOM,FIRST GESTATION: ICD-10-PCS | Mod: S$GLB,,, | Performed by: OBSTETRICS & GYNECOLOGY

## 2021-11-12 ENCOUNTER — PATIENT MESSAGE (OUTPATIENT)
Dept: MATERNAL FETAL MEDICINE | Facility: CLINIC | Age: 36
End: 2021-11-12
Payer: COMMERCIAL

## 2021-11-16 ENCOUNTER — TELEPHONE (OUTPATIENT)
Dept: OBSTETRICS AND GYNECOLOGY | Facility: CLINIC | Age: 36
End: 2021-11-16
Payer: COMMERCIAL

## 2021-11-16 ENCOUNTER — PATIENT MESSAGE (OUTPATIENT)
Dept: MATERNAL FETAL MEDICINE | Facility: CLINIC | Age: 36
End: 2021-11-16
Payer: COMMERCIAL

## 2021-11-16 ENCOUNTER — TELEPHONE (OUTPATIENT)
Dept: MATERNAL FETAL MEDICINE | Facility: CLINIC | Age: 36
End: 2021-11-16
Payer: COMMERCIAL

## 2021-11-23 ENCOUNTER — PATIENT MESSAGE (OUTPATIENT)
Dept: MATERNAL FETAL MEDICINE | Facility: CLINIC | Age: 36
End: 2021-11-23
Payer: COMMERCIAL

## 2021-11-24 ENCOUNTER — TELEPHONE (OUTPATIENT)
Dept: OBSTETRICS AND GYNECOLOGY | Facility: CLINIC | Age: 36
End: 2021-11-24
Payer: COMMERCIAL

## 2021-11-24 ENCOUNTER — PROCEDURE VISIT (OUTPATIENT)
Dept: MATERNAL FETAL MEDICINE | Facility: CLINIC | Age: 36
End: 2021-11-24
Payer: COMMERCIAL

## 2021-11-24 DIAGNOSIS — Z36.89 ENCOUNTER FOR ULTRASOUND TO ASSESS FETAL GROWTH: ICD-10-CM

## 2021-11-24 PROCEDURE — 76816 US MFM PROCEDURE (VIEWPOINT): ICD-10-PCS | Mod: S$GLB,,, | Performed by: OBSTETRICS & GYNECOLOGY

## 2021-11-24 PROCEDURE — 76816 OB US FOLLOW-UP PER FETUS: CPT | Mod: S$GLB,,, | Performed by: OBSTETRICS & GYNECOLOGY

## 2021-12-01 ENCOUNTER — ROUTINE PRENATAL (OUTPATIENT)
Dept: OBSTETRICS AND GYNECOLOGY | Facility: CLINIC | Age: 36
End: 2021-12-01
Payer: COMMERCIAL

## 2021-12-01 ENCOUNTER — LAB VISIT (OUTPATIENT)
Dept: LAB | Facility: OTHER | Age: 36
End: 2021-12-01
Attending: OBSTETRICS & GYNECOLOGY
Payer: COMMERCIAL

## 2021-12-01 VITALS
BODY MASS INDEX: 27.88 KG/M2 | WEIGHT: 167.56 LBS | DIASTOLIC BLOOD PRESSURE: 72 MMHG | SYSTOLIC BLOOD PRESSURE: 114 MMHG

## 2021-12-01 DIAGNOSIS — O09.529 ANTEPARTUM MULTIGRAVIDA OF ADVANCED MATERNAL AGE: ICD-10-CM

## 2021-12-01 DIAGNOSIS — Z87.59 HISTORY OF GESTATIONAL HYPERTENSION: Primary | ICD-10-CM

## 2021-12-01 DIAGNOSIS — O34.00 BICORNUATE UTERUS AFFECTING PREGNANCY, ANTEPARTUM: ICD-10-CM

## 2021-12-01 DIAGNOSIS — Z34.92 NORMAL PREGNANCY, SECOND TRIMESTER: ICD-10-CM

## 2021-12-01 DIAGNOSIS — Q51.3 BICORNUATE UTERUS AFFECTING PREGNANCY, ANTEPARTUM: ICD-10-CM

## 2021-12-01 LAB
BASOPHILS # BLD AUTO: 0.03 K/UL (ref 0–0.2)
BASOPHILS NFR BLD: 0.3 % (ref 0–1.9)
DIFFERENTIAL METHOD: ABNORMAL
EOSINOPHIL # BLD AUTO: 0 K/UL (ref 0–0.5)
EOSINOPHIL NFR BLD: 0.4 % (ref 0–8)
ERYTHROCYTE [DISTWIDTH] IN BLOOD BY AUTOMATED COUNT: 12.8 % (ref 11.5–14.5)
GLUCOSE SERPL-MCNC: 176 MG/DL (ref 70–140)
HCT VFR BLD AUTO: 34.4 % (ref 37–48.5)
HGB BLD-MCNC: 11 G/DL (ref 12–16)
IMM GRANULOCYTES # BLD AUTO: 0.04 K/UL (ref 0–0.04)
IMM GRANULOCYTES NFR BLD AUTO: 0.4 % (ref 0–0.5)
LYMPHOCYTES # BLD AUTO: 2.2 K/UL (ref 1–4.8)
LYMPHOCYTES NFR BLD: 22.5 % (ref 18–48)
MCH RBC QN AUTO: 30.6 PG (ref 27–31)
MCHC RBC AUTO-ENTMCNC: 32 G/DL (ref 32–36)
MCV RBC AUTO: 96 FL (ref 82–98)
MONOCYTES # BLD AUTO: 0.6 K/UL (ref 0.3–1)
MONOCYTES NFR BLD: 5.6 % (ref 4–15)
NEUTROPHILS # BLD AUTO: 7 K/UL (ref 1.8–7.7)
NEUTROPHILS NFR BLD: 70.8 % (ref 38–73)
NRBC BLD-RTO: 0 /100 WBC
PLATELET # BLD AUTO: 267 K/UL (ref 150–450)
PMV BLD AUTO: 10.8 FL (ref 9.2–12.9)
RBC # BLD AUTO: 3.6 M/UL (ref 4–5.4)
WBC # BLD AUTO: 9.95 K/UL (ref 3.9–12.7)

## 2021-12-01 PROCEDURE — 99999 PR PBB SHADOW E&M-EST. PATIENT-LVL II: ICD-10-PCS | Mod: PBBFAC,,, | Performed by: OBSTETRICS & GYNECOLOGY

## 2021-12-01 PROCEDURE — 36415 COLL VENOUS BLD VENIPUNCTURE: CPT | Performed by: OBSTETRICS & GYNECOLOGY

## 2021-12-01 PROCEDURE — 0502F SUBSEQUENT PRENATAL CARE: CPT | Mod: CPTII,S$GLB,, | Performed by: OBSTETRICS & GYNECOLOGY

## 2021-12-01 PROCEDURE — 82950 GLUCOSE TEST: CPT | Performed by: OBSTETRICS & GYNECOLOGY

## 2021-12-01 PROCEDURE — 99999 PR PBB SHADOW E&M-EST. PATIENT-LVL II: CPT | Mod: PBBFAC,,, | Performed by: OBSTETRICS & GYNECOLOGY

## 2021-12-01 PROCEDURE — 85025 COMPLETE CBC W/AUTO DIFF WBC: CPT | Performed by: OBSTETRICS & GYNECOLOGY

## 2021-12-01 PROCEDURE — 0502F PR SUBSEQUENT PRENATAL CARE: ICD-10-PCS | Mod: CPTII,S$GLB,, | Performed by: OBSTETRICS & GYNECOLOGY

## 2021-12-03 DIAGNOSIS — O99.810 ABNORMAL GLUCOSE TOLERANCE AFFECTING PREGNANCY, ANTEPARTUM: Primary | ICD-10-CM

## 2021-12-06 ENCOUNTER — PATIENT MESSAGE (OUTPATIENT)
Dept: ADMINISTRATIVE | Facility: OTHER | Age: 36
End: 2021-12-06
Payer: COMMERCIAL

## 2021-12-14 ENCOUNTER — LAB VISIT (OUTPATIENT)
Dept: LAB | Facility: HOSPITAL | Age: 36
End: 2021-12-14
Attending: OBSTETRICS & GYNECOLOGY
Payer: COMMERCIAL

## 2021-12-14 DIAGNOSIS — O99.810 ABNORMAL GLUCOSE TOLERANCE AFFECTING PREGNANCY, ANTEPARTUM: ICD-10-CM

## 2021-12-14 LAB
GLUCOSE SERPL-MCNC: 142 MG/DL
GLUCOSE SERPL-MCNC: 59 MG/DL
GLUCOSE SERPL-MCNC: 81 MG/DL (ref 70–110)
GLUCOSE SERPL-MCNC: 93 MG/DL

## 2021-12-14 PROCEDURE — 82951 GLUCOSE TOLERANCE TEST (GTT): CPT | Performed by: OBSTETRICS & GYNECOLOGY

## 2021-12-29 ENCOUNTER — ROUTINE PRENATAL (OUTPATIENT)
Dept: OBSTETRICS AND GYNECOLOGY | Facility: CLINIC | Age: 36
End: 2021-12-29
Payer: COMMERCIAL

## 2021-12-29 ENCOUNTER — CLINICAL SUPPORT (OUTPATIENT)
Dept: OBSTETRICS AND GYNECOLOGY | Facility: CLINIC | Age: 36
End: 2021-12-29
Payer: COMMERCIAL

## 2021-12-29 VITALS
SYSTOLIC BLOOD PRESSURE: 110 MMHG | DIASTOLIC BLOOD PRESSURE: 68 MMHG | BODY MASS INDEX: 28.73 KG/M2 | WEIGHT: 172.63 LBS

## 2021-12-29 DIAGNOSIS — O09.529 ANTEPARTUM MULTIGRAVIDA OF ADVANCED MATERNAL AGE: ICD-10-CM

## 2021-12-29 DIAGNOSIS — Z23 NEED FOR TDAP VACCINATION: Primary | ICD-10-CM

## 2021-12-29 DIAGNOSIS — Q51.3 BICORNUATE UTERUS AFFECTING PREGNANCY, ANTEPARTUM: ICD-10-CM

## 2021-12-29 DIAGNOSIS — Z87.59 HISTORY OF GESTATIONAL HYPERTENSION: Primary | ICD-10-CM

## 2021-12-29 DIAGNOSIS — Z3A.28 28 WEEKS GESTATION OF PREGNANCY: ICD-10-CM

## 2021-12-29 DIAGNOSIS — O34.00 BICORNUATE UTERUS AFFECTING PREGNANCY, ANTEPARTUM: ICD-10-CM

## 2021-12-29 PROCEDURE — 99999 PR PBB SHADOW E&M-EST. PATIENT-LVL II: CPT | Mod: PBBFAC,,, | Performed by: OBSTETRICS & GYNECOLOGY

## 2021-12-29 PROCEDURE — 90715 TDAP VACCINE GREATER THAN OR EQUAL TO 7YO IM: ICD-10-PCS | Mod: S$GLB,,, | Performed by: OBSTETRICS & GYNECOLOGY

## 2021-12-29 PROCEDURE — 0502F SUBSEQUENT PRENATAL CARE: CPT | Mod: CPTII,S$GLB,, | Performed by: OBSTETRICS & GYNECOLOGY

## 2021-12-29 PROCEDURE — 0502F PR SUBSEQUENT PRENATAL CARE: ICD-10-PCS | Mod: CPTII,S$GLB,, | Performed by: OBSTETRICS & GYNECOLOGY

## 2021-12-29 PROCEDURE — 90471 TDAP VACCINE GREATER THAN OR EQUAL TO 7YO IM: ICD-10-PCS | Mod: S$GLB,,, | Performed by: OBSTETRICS & GYNECOLOGY

## 2021-12-29 PROCEDURE — 90715 TDAP VACCINE 7 YRS/> IM: CPT | Mod: S$GLB,,, | Performed by: OBSTETRICS & GYNECOLOGY

## 2021-12-29 PROCEDURE — 99999 PR PBB SHADOW E&M-EST. PATIENT-LVL II: ICD-10-PCS | Mod: PBBFAC,,, | Performed by: OBSTETRICS & GYNECOLOGY

## 2021-12-29 PROCEDURE — 90471 IMMUNIZATION ADMIN: CPT | Mod: S$GLB,,, | Performed by: OBSTETRICS & GYNECOLOGY

## 2022-01-24 ENCOUNTER — PATIENT MESSAGE (OUTPATIENT)
Dept: ADMINISTRATIVE | Facility: OTHER | Age: 37
End: 2022-01-24
Payer: COMMERCIAL

## 2022-01-26 ENCOUNTER — LAB VISIT (OUTPATIENT)
Dept: LAB | Facility: OTHER | Age: 37
End: 2022-01-26
Attending: OBSTETRICS & GYNECOLOGY
Payer: COMMERCIAL

## 2022-01-26 ENCOUNTER — ROUTINE PRENATAL (OUTPATIENT)
Dept: OBSTETRICS AND GYNECOLOGY | Facility: CLINIC | Age: 37
End: 2022-01-26
Payer: COMMERCIAL

## 2022-01-26 ENCOUNTER — PROCEDURE VISIT (OUTPATIENT)
Dept: OBSTETRICS AND GYNECOLOGY | Facility: CLINIC | Age: 37
End: 2022-01-26
Payer: COMMERCIAL

## 2022-01-26 VITALS — SYSTOLIC BLOOD PRESSURE: 138 MMHG | BODY MASS INDEX: 28.87 KG/M2 | WEIGHT: 173.5 LBS | DIASTOLIC BLOOD PRESSURE: 80 MMHG

## 2022-01-26 DIAGNOSIS — Z87.59 HISTORY OF GESTATIONAL HYPERTENSION: ICD-10-CM

## 2022-01-26 DIAGNOSIS — Q51.3 BICORNUATE UTERUS AFFECTING PREGNANCY, ANTEPARTUM: ICD-10-CM

## 2022-01-26 DIAGNOSIS — O43.123 VELAMENTOUS INSERTION OF UMBILICAL CORD IN THIRD TRIMESTER: ICD-10-CM

## 2022-01-26 DIAGNOSIS — O34.00 BICORNUATE UTERUS AFFECTING PREGNANCY, ANTEPARTUM: ICD-10-CM

## 2022-01-26 DIAGNOSIS — O09.529 ANTEPARTUM MULTIGRAVIDA OF ADVANCED MATERNAL AGE: ICD-10-CM

## 2022-01-26 DIAGNOSIS — Z3A.32 32 WEEKS GESTATION OF PREGNANCY: ICD-10-CM

## 2022-01-26 DIAGNOSIS — O43.123 VELAMENTOUS INSERTION OF UMBILICAL CORD IN THIRD TRIMESTER: Primary | ICD-10-CM

## 2022-01-26 LAB
ALBUMIN SERPL BCP-MCNC: 2.7 G/DL (ref 3.5–5.2)
ALP SERPL-CCNC: 132 U/L (ref 55–135)
ALT SERPL W/O P-5'-P-CCNC: 11 U/L (ref 10–44)
ANION GAP SERPL CALC-SCNC: 8 MMOL/L (ref 8–16)
AST SERPL-CCNC: 18 U/L (ref 10–40)
BASOPHILS # BLD AUTO: 0.03 K/UL (ref 0–0.2)
BASOPHILS NFR BLD: 0.3 % (ref 0–1.9)
BILIRUB SERPL-MCNC: 0.2 MG/DL (ref 0.1–1)
BUN SERPL-MCNC: 10 MG/DL (ref 6–20)
CALCIUM SERPL-MCNC: 9.2 MG/DL (ref 8.7–10.5)
CHLORIDE SERPL-SCNC: 106 MMOL/L (ref 95–110)
CO2 SERPL-SCNC: 25 MMOL/L (ref 23–29)
CREAT SERPL-MCNC: 0.8 MG/DL (ref 0.5–1.4)
DIFFERENTIAL METHOD: ABNORMAL
EOSINOPHIL # BLD AUTO: 0 K/UL (ref 0–0.5)
EOSINOPHIL NFR BLD: 0.4 % (ref 0–8)
ERYTHROCYTE [DISTWIDTH] IN BLOOD BY AUTOMATED COUNT: 12.4 % (ref 11.5–14.5)
EST. GFR  (AFRICAN AMERICAN): >60 ML/MIN/1.73 M^2
EST. GFR  (NON AFRICAN AMERICAN): >60 ML/MIN/1.73 M^2
GLUCOSE SERPL-MCNC: 103 MG/DL (ref 70–110)
HCT VFR BLD AUTO: 33.5 % (ref 37–48.5)
HGB BLD-MCNC: 10.7 G/DL (ref 12–16)
IMM GRANULOCYTES # BLD AUTO: 0.03 K/UL (ref 0–0.04)
IMM GRANULOCYTES NFR BLD AUTO: 0.3 % (ref 0–0.5)
LYMPHOCYTES # BLD AUTO: 1.9 K/UL (ref 1–4.8)
LYMPHOCYTES NFR BLD: 19.7 % (ref 18–48)
MCH RBC QN AUTO: 29.3 PG (ref 27–31)
MCHC RBC AUTO-ENTMCNC: 31.9 G/DL (ref 32–36)
MCV RBC AUTO: 92 FL (ref 82–98)
MONOCYTES # BLD AUTO: 0.7 K/UL (ref 0.3–1)
MONOCYTES NFR BLD: 7.1 % (ref 4–15)
NEUTROPHILS # BLD AUTO: 6.8 K/UL (ref 1.8–7.7)
NEUTROPHILS NFR BLD: 72.2 % (ref 38–73)
NRBC BLD-RTO: 0 /100 WBC
PLATELET # BLD AUTO: 265 K/UL (ref 150–450)
PMV BLD AUTO: 11.4 FL (ref 9.2–12.9)
POTASSIUM SERPL-SCNC: 4 MMOL/L (ref 3.5–5.1)
PROT SERPL-MCNC: 6.5 G/DL (ref 6–8.4)
RBC # BLD AUTO: 3.65 M/UL (ref 4–5.4)
SODIUM SERPL-SCNC: 139 MMOL/L (ref 136–145)
WBC # BLD AUTO: 9.42 K/UL (ref 3.9–12.7)

## 2022-01-26 PROCEDURE — 76816 OB US FOLLOW-UP PER FETUS: CPT | Mod: S$GLB,,, | Performed by: OBSTETRICS & GYNECOLOGY

## 2022-01-26 PROCEDURE — 82570 ASSAY OF URINE CREATININE: CPT | Performed by: OBSTETRICS & GYNECOLOGY

## 2022-01-26 PROCEDURE — 0502F SUBSEQUENT PRENATAL CARE: CPT | Mod: CPTII,S$GLB,, | Performed by: OBSTETRICS & GYNECOLOGY

## 2022-01-26 PROCEDURE — 80053 COMPREHEN METABOLIC PANEL: CPT | Performed by: OBSTETRICS & GYNECOLOGY

## 2022-01-26 PROCEDURE — 99999 PR PBB SHADOW E&M-EST. PATIENT-LVL III: CPT | Mod: PBBFAC,,, | Performed by: OBSTETRICS & GYNECOLOGY

## 2022-01-26 PROCEDURE — 85025 COMPLETE CBC W/AUTO DIFF WBC: CPT | Performed by: OBSTETRICS & GYNECOLOGY

## 2022-01-26 PROCEDURE — 76816 US OB/GYN EXTENDED PROCEDURE (VIEWPOINT): ICD-10-PCS | Mod: S$GLB,,, | Performed by: OBSTETRICS & GYNECOLOGY

## 2022-01-26 PROCEDURE — 36415 COLL VENOUS BLD VENIPUNCTURE: CPT | Performed by: OBSTETRICS & GYNECOLOGY

## 2022-01-26 PROCEDURE — 99999 PR PBB SHADOW E&M-EST. PATIENT-LVL III: ICD-10-PCS | Mod: PBBFAC,,, | Performed by: OBSTETRICS & GYNECOLOGY

## 2022-01-26 PROCEDURE — 0502F PR SUBSEQUENT PRENATAL CARE: ICD-10-PCS | Mod: CPTII,S$GLB,, | Performed by: OBSTETRICS & GYNECOLOGY

## 2022-01-26 NOTE — PROGRESS NOTES
32w2d with c/o fatigue and swelling.   BPs elevated - pre-e work up collected. No HAs or vision changes.   U/S today shows EFW 51%ile.   PPC discussed and patient would like time to consider.  RTC in 2 weeks for routine PNC.

## 2022-01-27 ENCOUNTER — PATIENT MESSAGE (OUTPATIENT)
Dept: OBSTETRICS AND GYNECOLOGY | Facility: CLINIC | Age: 37
End: 2022-01-27
Payer: COMMERCIAL

## 2022-01-27 DIAGNOSIS — O14.93 PRE-ECLAMPSIA IN THIRD TRIMESTER: Primary | ICD-10-CM

## 2022-01-27 LAB
CREAT UR-MCNC: 152 MG/DL (ref 15–325)
PROT UR-MCNC: 45 MG/DL (ref 0–15)
PROT/CREAT UR: 0.3 MG/G{CREAT} (ref 0–0.2)

## 2022-01-28 ENCOUNTER — TELEPHONE (OUTPATIENT)
Dept: OBSTETRICS AND GYNECOLOGY | Facility: CLINIC | Age: 37
End: 2022-01-28
Payer: COMMERCIAL

## 2022-01-28 ENCOUNTER — PATIENT MESSAGE (OUTPATIENT)
Dept: OBSTETRICS AND GYNECOLOGY | Facility: CLINIC | Age: 37
End: 2022-01-28
Payer: COMMERCIAL

## 2022-01-28 DIAGNOSIS — O14.93 PRE-ECLAMPSIA IN THIRD TRIMESTER: Primary | ICD-10-CM

## 2022-01-28 DIAGNOSIS — O43.123 VELAMENTOUS INSERTION OF UMBILICAL CORD IN THIRD TRIMESTER: ICD-10-CM

## 2022-01-28 DIAGNOSIS — O09.529 ANTEPARTUM MULTIGRAVIDA OF ADVANCED MATERNAL AGE: ICD-10-CM

## 2022-01-28 NOTE — TELEPHONE ENCOUNTER
----- Message from Adenike Mora MD sent at 2022 11:05 AM CST -----  Can we please request an IOL for Nadya for 5 pm on Monday, ?   She will be 37w0d.   .   She has Pre-Eclampsia, AMA, velamentous cord insertion.   Thank you!

## 2022-01-28 NOTE — TELEPHONE ENCOUNTER
Induction requested for 2/28/2022 at 1700.    Covid ordered, pt to be scheduled 2/25/2022.  Message sent to pt.

## 2022-02-03 ENCOUNTER — TELEPHONE (OUTPATIENT)
Dept: OBSTETRICS AND GYNECOLOGY | Facility: CLINIC | Age: 37
End: 2022-02-03

## 2022-02-03 ENCOUNTER — TELEPHONE (OUTPATIENT)
Dept: OBSTETRICS AND GYNECOLOGY | Facility: CLINIC | Age: 37
End: 2022-02-03
Payer: COMMERCIAL

## 2022-02-03 NOTE — TELEPHONE ENCOUNTER
----- Message from Stefany Davis MA sent at 2/1/2022  8:25 AM CST -----  Will you please assist with scheduling at [facility]?  ----- Message -----  From: Adenike Mora MD  Sent: 1/28/2022  10:23 AM CST  To: [facility] Staff    Ok. I have spoken with Mable so we can get her set up for a weekly NST and a weekly BPP from here through 37 weeks (so the next 5 weeks)

## 2022-02-09 ENCOUNTER — HOSPITAL ENCOUNTER (EMERGENCY)
Facility: OTHER | Age: 37
Discharge: HOME | End: 2022-02-09
Attending: OBSTETRICS & GYNECOLOGY
Payer: COMMERCIAL

## 2022-02-09 ENCOUNTER — PROCEDURE VISIT (OUTPATIENT)
Dept: OBSTETRICS AND GYNECOLOGY | Facility: CLINIC | Age: 37
End: 2022-02-09
Payer: COMMERCIAL

## 2022-02-09 ENCOUNTER — ROUTINE PRENATAL (OUTPATIENT)
Dept: OBSTETRICS AND GYNECOLOGY | Facility: CLINIC | Age: 37
End: 2022-02-09
Payer: COMMERCIAL

## 2022-02-09 VITALS
HEART RATE: 110 BPM | SYSTOLIC BLOOD PRESSURE: 124 MMHG | DIASTOLIC BLOOD PRESSURE: 83 MMHG | OXYGEN SATURATION: 100 % | RESPIRATION RATE: 19 BRPM | TEMPERATURE: 98 F

## 2022-02-09 VITALS
BODY MASS INDEX: 29.07 KG/M2 | WEIGHT: 174.69 LBS | DIASTOLIC BLOOD PRESSURE: 92 MMHG | SYSTOLIC BLOOD PRESSURE: 158 MMHG

## 2022-02-09 DIAGNOSIS — O14.93 PRE-ECLAMPSIA IN THIRD TRIMESTER: Primary | ICD-10-CM

## 2022-02-09 DIAGNOSIS — Q51.3 BICORNUATE UTERUS AFFECTING PREGNANCY, ANTEPARTUM: ICD-10-CM

## 2022-02-09 DIAGNOSIS — O14.93 PRE-ECLAMPSIA IN THIRD TRIMESTER: ICD-10-CM

## 2022-02-09 DIAGNOSIS — O09.529 ANTEPARTUM MULTIGRAVIDA OF ADVANCED MATERNAL AGE: ICD-10-CM

## 2022-02-09 DIAGNOSIS — Z3A.34 34 WEEKS GESTATION OF PREGNANCY: ICD-10-CM

## 2022-02-09 DIAGNOSIS — O43.123 VELAMENTOUS INSERTION OF UMBILICAL CORD IN THIRD TRIMESTER: ICD-10-CM

## 2022-02-09 DIAGNOSIS — O34.00 BICORNUATE UTERUS AFFECTING PREGNANCY, ANTEPARTUM: ICD-10-CM

## 2022-02-09 LAB
ALBUMIN SERPL BCP-MCNC: 2.9 G/DL (ref 3.5–5.2)
ALP SERPL-CCNC: 143 U/L (ref 55–135)
ALT SERPL W/O P-5'-P-CCNC: 15 U/L (ref 10–44)
ANION GAP SERPL CALC-SCNC: 9 MMOL/L (ref 8–16)
AST SERPL-CCNC: 22 U/L (ref 10–40)
BASOPHILS # BLD AUTO: 0.02 K/UL (ref 0–0.2)
BASOPHILS NFR BLD: 0.2 % (ref 0–1.9)
BILIRUB SERPL-MCNC: 0.3 MG/DL (ref 0.1–1)
BUN SERPL-MCNC: 10 MG/DL (ref 6–20)
CALCIUM SERPL-MCNC: 9.1 MG/DL (ref 8.7–10.5)
CHLORIDE SERPL-SCNC: 105 MMOL/L (ref 95–110)
CO2 SERPL-SCNC: 23 MMOL/L (ref 23–29)
CREAT SERPL-MCNC: 0.7 MG/DL (ref 0.5–1.4)
CREAT UR-MCNC: 23.3 MG/DL (ref 15–325)
DIFFERENTIAL METHOD: ABNORMAL
EOSINOPHIL # BLD AUTO: 0 K/UL (ref 0–0.5)
EOSINOPHIL NFR BLD: 0.1 % (ref 0–8)
ERYTHROCYTE [DISTWIDTH] IN BLOOD BY AUTOMATED COUNT: 14 % (ref 11.5–14.5)
EST. GFR  (AFRICAN AMERICAN): >60 ML/MIN/1.73 M^2
EST. GFR  (NON AFRICAN AMERICAN): >60 ML/MIN/1.73 M^2
GLUCOSE SERPL-MCNC: 115 MG/DL (ref 70–110)
HCT VFR BLD AUTO: 34.5 % (ref 37–48.5)
HGB BLD-MCNC: 11.4 G/DL (ref 12–16)
IMM GRANULOCYTES # BLD AUTO: 0.06 K/UL (ref 0–0.04)
IMM GRANULOCYTES NFR BLD AUTO: 0.5 % (ref 0–0.5)
INFLUENZA A, MOLECULAR: NEGATIVE
INFLUENZA B, MOLECULAR: NEGATIVE
LYMPHOCYTES # BLD AUTO: 2.4 K/UL (ref 1–4.8)
LYMPHOCYTES NFR BLD: 21.3 % (ref 18–48)
MCH RBC QN AUTO: 30 PG (ref 27–31)
MCHC RBC AUTO-ENTMCNC: 33 G/DL (ref 32–36)
MCV RBC AUTO: 91 FL (ref 82–98)
MONOCYTES # BLD AUTO: 0.7 K/UL (ref 0.3–1)
MONOCYTES NFR BLD: 6.5 % (ref 4–15)
NEUTROPHILS # BLD AUTO: 8.2 K/UL (ref 1.8–7.7)
NEUTROPHILS NFR BLD: 71.4 % (ref 38–73)
NRBC BLD-RTO: 0 /100 WBC
PLATELET # BLD AUTO: 290 K/UL (ref 150–450)
PMV BLD AUTO: 11 FL (ref 9.2–12.9)
POTASSIUM SERPL-SCNC: 3.8 MMOL/L (ref 3.5–5.1)
PROT SERPL-MCNC: 6.6 G/DL (ref 6–8.4)
PROT UR-MCNC: <7 MG/DL (ref 0–15)
PROT/CREAT UR: NORMAL MG/G{CREAT} (ref 0–0.2)
RBC # BLD AUTO: 3.8 M/UL (ref 4–5.4)
SARS-COV-2 RDRP RESP QL NAA+PROBE: NEGATIVE
SODIUM SERPL-SCNC: 137 MMOL/L (ref 136–145)
SPECIMEN SOURCE: NORMAL
WBC # BLD AUTO: 11.44 K/UL (ref 3.9–12.7)

## 2022-02-09 PROCEDURE — 59025 FETAL NON-STRESS TEST: CPT | Mod: 26,,, | Performed by: OBSTETRICS & GYNECOLOGY

## 2022-02-09 PROCEDURE — 59025 FETAL NON-STRESS TEST: CPT

## 2022-02-09 PROCEDURE — 0502F PR SUBSEQUENT PRENATAL CARE: ICD-10-PCS | Mod: CPTII,S$GLB,, | Performed by: OBSTETRICS & GYNECOLOGY

## 2022-02-09 PROCEDURE — 99284 PR EMERGENCY DEPT VISIT,LEVEL IV: ICD-10-PCS | Mod: 25,,, | Performed by: OBSTETRICS & GYNECOLOGY

## 2022-02-09 PROCEDURE — 87502 INFLUENZA DNA AMP PROBE: CPT | Performed by: OBSTETRICS & GYNECOLOGY

## 2022-02-09 PROCEDURE — 76819 FETAL BIOPHYS PROFIL W/O NST: CPT | Mod: S$GLB,,, | Performed by: OBSTETRICS & GYNECOLOGY

## 2022-02-09 PROCEDURE — 99284 EMERGENCY DEPT VISIT MOD MDM: CPT | Mod: 25,,, | Performed by: OBSTETRICS & GYNECOLOGY

## 2022-02-09 PROCEDURE — 0502F SUBSEQUENT PRENATAL CARE: CPT | Mod: CPTII,S$GLB,, | Performed by: OBSTETRICS & GYNECOLOGY

## 2022-02-09 PROCEDURE — U0002 COVID-19 LAB TEST NON-CDC: HCPCS | Performed by: OBSTETRICS & GYNECOLOGY

## 2022-02-09 PROCEDURE — 99999 PR PBB SHADOW E&M-EST. PATIENT-LVL II: ICD-10-PCS | Mod: PBBFAC,,, | Performed by: OBSTETRICS & GYNECOLOGY

## 2022-02-09 PROCEDURE — 59025 PR FETAL 2N-STRESS TEST: ICD-10-PCS | Mod: 26,,, | Performed by: OBSTETRICS & GYNECOLOGY

## 2022-02-09 PROCEDURE — 99284 EMERGENCY DEPT VISIT MOD MDM: CPT | Mod: 25

## 2022-02-09 PROCEDURE — 84156 ASSAY OF PROTEIN URINE: CPT | Performed by: STUDENT IN AN ORGANIZED HEALTH CARE EDUCATION/TRAINING PROGRAM

## 2022-02-09 PROCEDURE — 99999 PR PBB SHADOW E&M-EST. PATIENT-LVL II: CPT | Mod: PBBFAC,,, | Performed by: OBSTETRICS & GYNECOLOGY

## 2022-02-09 PROCEDURE — 76819 US OB/GYN EXTENDED PROCEDURE (VIEWPOINT): ICD-10-PCS | Mod: S$GLB,,, | Performed by: OBSTETRICS & GYNECOLOGY

## 2022-02-09 PROCEDURE — 85025 COMPLETE CBC W/AUTO DIFF WBC: CPT | Performed by: STUDENT IN AN ORGANIZED HEALTH CARE EDUCATION/TRAINING PROGRAM

## 2022-02-09 PROCEDURE — 80053 COMPREHEN METABOLIC PANEL: CPT | Performed by: STUDENT IN AN ORGANIZED HEALTH CARE EDUCATION/TRAINING PROGRAM

## 2022-02-09 NOTE — DISCHARGE INSTRUCTIONS
Please return to Labor and Delivery if you experience any leaking of fluid (like your water bag broke), vaginal bleeding (soaking more than 1 pad per hour), decreased fetal movement, and/or having contractions every 2-5 minutes for 2 hours.     Labor  and Delivery: [phone]
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
## 2022-02-09 NOTE — PROGRESS NOTES
34w2d with complaints of nasal congestion for the past 2-3 days to the point that she vomited in the exam room during today's visit. Reports this is the first episode of emesis.   Dx'd with pre-e 2 weeks ago. Has had a few HAs but these resolve with sleep and or tylenol. Reporting some scotomata today.   BPP 8/8 but took a long time to meet all criteria - patient reporting good fetal movement.   Patient believes a lot of her symptomatology is related to anxiety from coming to my office.   Sent to L&D for pre-e lab evaluation to check for signs of progressing disease process.

## 2022-02-10 ENCOUNTER — PATIENT MESSAGE (OUTPATIENT)
Dept: OBSTETRICS AND GYNECOLOGY | Facility: CLINIC | Age: 37
End: 2022-02-10
Payer: COMMERCIAL

## 2022-02-15 ENCOUNTER — HOSPITAL ENCOUNTER (OUTPATIENT)
Dept: PERINATAL CARE | Facility: OTHER | Age: 37
Discharge: HOME | End: 2022-02-15
Attending: OBSTETRICS & GYNECOLOGY
Payer: COMMERCIAL

## 2022-02-15 DIAGNOSIS — O14.93 PRE-ECLAMPSIA IN THIRD TRIMESTER: ICD-10-CM

## 2022-02-15 DIAGNOSIS — O14.93 PRE-ECLAMPSIA IN THIRD TRIMESTER: Primary | ICD-10-CM

## 2022-02-15 PROCEDURE — 59025 FETAL NON-STRESS TEST: CPT

## 2022-02-15 PROCEDURE — 76815 PRENATAL TESTING - NST/AFI: ICD-10-PCS | Mod: 26,,, | Performed by: OBSTETRICS & GYNECOLOGY

## 2022-02-15 PROCEDURE — 59025 FETAL NON-STRESS TEST: CPT | Mod: 26,,, | Performed by: OBSTETRICS & GYNECOLOGY

## 2022-02-15 PROCEDURE — 59025 PRENATAL TESTING - NST/AFI: ICD-10-PCS | Mod: 26,,, | Performed by: OBSTETRICS & GYNECOLOGY

## 2022-02-15 PROCEDURE — 76815 OB US LIMITED FETUS(S): CPT

## 2022-02-15 PROCEDURE — 76815 OB US LIMITED FETUS(S): CPT | Mod: 26,,, | Performed by: OBSTETRICS & GYNECOLOGY

## 2022-02-18 ENCOUNTER — PROCEDURE VISIT (OUTPATIENT)
Dept: OBSTETRICS AND GYNECOLOGY | Facility: CLINIC | Age: 37
End: 2022-02-18
Payer: COMMERCIAL

## 2022-02-18 ENCOUNTER — ROUTINE PRENATAL (OUTPATIENT)
Dept: OBSTETRICS AND GYNECOLOGY | Facility: CLINIC | Age: 37
End: 2022-02-18
Payer: COMMERCIAL

## 2022-02-18 VITALS
DIASTOLIC BLOOD PRESSURE: 100 MMHG | SYSTOLIC BLOOD PRESSURE: 144 MMHG | WEIGHT: 176.38 LBS | BODY MASS INDEX: 29.35 KG/M2

## 2022-02-18 DIAGNOSIS — O14.93 PRE-ECLAMPSIA IN THIRD TRIMESTER: Primary | ICD-10-CM

## 2022-02-18 DIAGNOSIS — O43.123 VELAMENTOUS INSERTION OF UMBILICAL CORD IN THIRD TRIMESTER: ICD-10-CM

## 2022-02-18 DIAGNOSIS — Z36.9 ANTENATAL SCREENING ENCOUNTER: ICD-10-CM

## 2022-02-18 DIAGNOSIS — Q51.3 BICORNUATE UTERUS AFFECTING PREGNANCY, ANTEPARTUM: ICD-10-CM

## 2022-02-18 DIAGNOSIS — Z3A.35 35 WEEKS GESTATION OF PREGNANCY: ICD-10-CM

## 2022-02-18 DIAGNOSIS — O34.00 BICORNUATE UTERUS AFFECTING PREGNANCY, ANTEPARTUM: ICD-10-CM

## 2022-02-18 DIAGNOSIS — O14.93 PRE-ECLAMPSIA IN THIRD TRIMESTER: ICD-10-CM

## 2022-02-18 DIAGNOSIS — O09.529 ANTEPARTUM MULTIGRAVIDA OF ADVANCED MATERNAL AGE: ICD-10-CM

## 2022-02-18 PROCEDURE — 76819 FETAL BIOPHYS PROFIL W/O NST: CPT | Mod: S$GLB,,, | Performed by: OBSTETRICS & GYNECOLOGY

## 2022-02-18 PROCEDURE — 99999 PR PBB SHADOW E&M-EST. PATIENT-LVL II: ICD-10-PCS | Mod: PBBFAC,,, | Performed by: OBSTETRICS & GYNECOLOGY

## 2022-02-18 PROCEDURE — 99999 PR PBB SHADOW E&M-EST. PATIENT-LVL II: CPT | Mod: PBBFAC,,, | Performed by: OBSTETRICS & GYNECOLOGY

## 2022-02-18 PROCEDURE — 0502F SUBSEQUENT PRENATAL CARE: CPT | Mod: CPTII,S$GLB,, | Performed by: OBSTETRICS & GYNECOLOGY

## 2022-02-18 PROCEDURE — 76819 US OB/GYN EXTENDED PROCEDURE (VIEWPOINT): ICD-10-PCS | Mod: S$GLB,,, | Performed by: OBSTETRICS & GYNECOLOGY

## 2022-02-18 PROCEDURE — 87081 CULTURE SCREEN ONLY: CPT | Performed by: OBSTETRICS & GYNECOLOGY

## 2022-02-18 PROCEDURE — 0502F PR SUBSEQUENT PRENATAL CARE: ICD-10-PCS | Mod: CPTII,S$GLB,, | Performed by: OBSTETRICS & GYNECOLOGY

## 2022-02-18 NOTE — PROGRESS NOTES
35w4d without major complaints.   HAs are resolving with tylenol. Some CP that patient reports is anxiety. No SOB.   BPP .   Pre-e precautions reviewed.     R/B/A of vaginal, operative, and  delivery as well as transfusion of blood products discussed today. All questions answered and patient voices understanding. Consents signed.   GBS, HIV, RPR, CBC collected.   Labor precautions discussed.   RTC in 4 days for routine PNC and  testing.

## 2022-02-21 LAB — BACTERIA SPEC AEROBE CULT: NORMAL

## 2022-02-22 ENCOUNTER — ANESTHESIA EVENT (OUTPATIENT)
Dept: OBSTETRICS AND GYNECOLOGY | Facility: OTHER | Age: 37
End: 2022-02-22
Payer: COMMERCIAL

## 2022-02-22 ENCOUNTER — HOSPITAL ENCOUNTER (INPATIENT)
Facility: OTHER | Age: 37
LOS: 2 days | Discharge: HOME | End: 2022-02-24
Attending: OBSTETRICS & GYNECOLOGY | Admitting: OBSTETRICS & GYNECOLOGY
Payer: COMMERCIAL

## 2022-02-22 ENCOUNTER — ANESTHESIA (OUTPATIENT)
Dept: OBSTETRICS AND GYNECOLOGY | Facility: OTHER | Age: 37
End: 2022-02-22
Payer: COMMERCIAL

## 2022-02-22 ENCOUNTER — ROUTINE PRENATAL (OUTPATIENT)
Dept: OBSTETRICS AND GYNECOLOGY | Facility: CLINIC | Age: 37
End: 2022-02-22
Payer: COMMERCIAL

## 2022-02-22 ENCOUNTER — HOSPITAL ENCOUNTER (OUTPATIENT)
Dept: PERINATAL CARE | Facility: OTHER | Age: 37
Discharge: HOME | End: 2022-02-22
Attending: OBSTETRICS & GYNECOLOGY
Payer: COMMERCIAL

## 2022-02-22 VITALS — WEIGHT: 183 LBS | SYSTOLIC BLOOD PRESSURE: 142 MMHG | DIASTOLIC BLOOD PRESSURE: 90 MMHG | BODY MASS INDEX: 30.45 KG/M2

## 2022-02-22 DIAGNOSIS — Z3A.36 36 WEEKS GESTATION OF PREGNANCY: ICD-10-CM

## 2022-02-22 DIAGNOSIS — O34.00 BICORNUATE UTERUS AFFECTING PREGNANCY, ANTEPARTUM: ICD-10-CM

## 2022-02-22 DIAGNOSIS — O36.8390 NON-REASSURING FETAL HEART RATE WITH LATE DECELERATION: ICD-10-CM

## 2022-02-22 DIAGNOSIS — O99.340 ANXIETY DISORDER AFFECTING PREGNANCY, ANTEPARTUM: ICD-10-CM

## 2022-02-22 DIAGNOSIS — O14.93 PRE-ECLAMPSIA IN THIRD TRIMESTER: Primary | ICD-10-CM

## 2022-02-22 DIAGNOSIS — O14.93 PRE-ECLAMPSIA IN THIRD TRIMESTER: ICD-10-CM

## 2022-02-22 DIAGNOSIS — O09.523 MULTIGRAVIDA OF ADVANCED MATERNAL AGE IN THIRD TRIMESTER: ICD-10-CM

## 2022-02-22 DIAGNOSIS — F41.9 ANXIETY DISORDER AFFECTING PREGNANCY, ANTEPARTUM: ICD-10-CM

## 2022-02-22 DIAGNOSIS — Z30.09 ENCOUNTER FOR COUNSELING REGARDING INITIATION OF OTHER CONTRACEPTIVE MEASURE: ICD-10-CM

## 2022-02-22 DIAGNOSIS — Q51.3 BICORNUATE UTERUS AFFECTING PREGNANCY, ANTEPARTUM: ICD-10-CM

## 2022-02-22 DIAGNOSIS — O43.123 VELAMENTOUS INSERTION OF UMBILICAL CORD IN THIRD TRIMESTER: ICD-10-CM

## 2022-02-22 PROBLEM — O36.8330 CATEGORY II FETAL HEART RATE TRACING DURING MATERNAL CARE IN THIRD TRIMESTER: Status: ACTIVE | Noted: 2022-02-22

## 2022-02-22 PROBLEM — Z34.90 ENCOUNTER FOR INDUCTION OF LABOR: Status: ACTIVE | Noted: 2022-02-22

## 2022-02-22 LAB
ABO + RH BLD: NORMAL
ALBUMIN SERPL BCP-MCNC: 2.9 G/DL (ref 3.5–5.2)
ALP SERPL-CCNC: 164 U/L (ref 55–135)
ALT SERPL W/O P-5'-P-CCNC: 14 U/L (ref 10–44)
ANION GAP SERPL CALC-SCNC: 9 MMOL/L (ref 8–16)
AST SERPL-CCNC: 20 U/L (ref 10–40)
BASOPHILS # BLD AUTO: 0.03 K/UL (ref 0–0.2)
BASOPHILS NFR BLD: 0.3 % (ref 0–1.9)
BILIRUB SERPL-MCNC: 0.3 MG/DL (ref 0.1–1)
BLD GP AB SCN CELLS X3 SERPL QL: NORMAL
BUN SERPL-MCNC: 9 MG/DL (ref 6–20)
CALCIUM SERPL-MCNC: 9.4 MG/DL (ref 8.7–10.5)
CHLORIDE SERPL-SCNC: 106 MMOL/L (ref 95–110)
CO2 SERPL-SCNC: 23 MMOL/L (ref 23–29)
CREAT SERPL-MCNC: 0.7 MG/DL (ref 0.5–1.4)
CREAT UR-MCNC: 46.1 MG/DL (ref 15–325)
DIFFERENTIAL METHOD: ABNORMAL
EOSINOPHIL # BLD AUTO: 0 K/UL (ref 0–0.5)
EOSINOPHIL NFR BLD: 0.1 % (ref 0–8)
ERYTHROCYTE [DISTWIDTH] IN BLOOD BY AUTOMATED COUNT: 14.7 % (ref 11.5–14.5)
EST. GFR  (AFRICAN AMERICAN): >60 ML/MIN/1.73 M^2
EST. GFR  (NON AFRICAN AMERICAN): >60 ML/MIN/1.73 M^2
GLUCOSE SERPL-MCNC: 74 MG/DL (ref 70–110)
HCT VFR BLD AUTO: 36.9 % (ref 37–48.5)
HGB BLD-MCNC: 12.1 G/DL (ref 12–16)
HIV1+2 IGG SERPL QL IA.RAPID: NORMAL
IMM GRANULOCYTES # BLD AUTO: 0.03 K/UL (ref 0–0.04)
IMM GRANULOCYTES NFR BLD AUTO: 0.3 % (ref 0–0.5)
LYMPHOCYTES # BLD AUTO: 2.1 K/UL (ref 1–4.8)
LYMPHOCYTES NFR BLD: 23 % (ref 18–48)
MCH RBC QN AUTO: 29.6 PG (ref 27–31)
MCHC RBC AUTO-ENTMCNC: 32.8 G/DL (ref 32–36)
MCV RBC AUTO: 90 FL (ref 82–98)
MONOCYTES # BLD AUTO: 0.5 K/UL (ref 0.3–1)
MONOCYTES NFR BLD: 5.7 % (ref 4–15)
NEUTROPHILS # BLD AUTO: 6.5 K/UL (ref 1.8–7.7)
NEUTROPHILS NFR BLD: 70.6 % (ref 38–73)
NRBC BLD-RTO: 0 /100 WBC
PLATELET # BLD AUTO: 225 K/UL (ref 150–450)
PMV BLD AUTO: 11.4 FL (ref 9.2–12.9)
POTASSIUM SERPL-SCNC: 4.2 MMOL/L (ref 3.5–5.1)
PROT SERPL-MCNC: 6.6 G/DL (ref 6–8.4)
PROT UR-MCNC: 10 MG/DL (ref 0–15)
PROT/CREAT UR: 0.22 MG/G{CREAT} (ref 0–0.2)
RBC # BLD AUTO: 4.09 M/UL (ref 4–5.4)
SARS-COV-2 RDRP RESP QL NAA+PROBE: NEGATIVE
SODIUM SERPL-SCNC: 138 MMOL/L (ref 136–145)
WBC # BLD AUTO: 9.17 K/UL (ref 3.9–12.7)

## 2022-02-22 PROCEDURE — 99999 PR PBB SHADOW E&M-EST. PATIENT-LVL III: CPT | Mod: PBBFAC,,, | Performed by: OBSTETRICS & GYNECOLOGY

## 2022-02-22 PROCEDURE — 63600175 PHARM REV CODE 636 W HCPCS

## 2022-02-22 PROCEDURE — 72200005 HC VAGINAL DELIVERY LEVEL II

## 2022-02-22 PROCEDURE — C1751 CATH, INF, PER/CENT/MIDLINE: HCPCS | Performed by: ANESTHESIOLOGY

## 2022-02-22 PROCEDURE — 86703 HIV-1/HIV-2 1 RESULT ANTBDY: CPT

## 2022-02-22 PROCEDURE — 36415 COLL VENOUS BLD VENIPUNCTURE: CPT

## 2022-02-22 PROCEDURE — 59400 OBSTETRICAL CARE: CPT | Mod: ,,, | Performed by: OBSTETRICS & GYNECOLOGY

## 2022-02-22 PROCEDURE — 63600175 PHARM REV CODE 636 W HCPCS: Performed by: STUDENT IN AN ORGANIZED HEALTH CARE EDUCATION/TRAINING PROGRAM

## 2022-02-22 PROCEDURE — 25000003 PHARM REV CODE 250: Performed by: ANESTHESIOLOGY

## 2022-02-22 PROCEDURE — 25000003 PHARM REV CODE 250: Performed by: OBSTETRICS & GYNECOLOGY

## 2022-02-22 PROCEDURE — 80053 COMPREHEN METABOLIC PANEL: CPT

## 2022-02-22 PROCEDURE — 59400 PR FULL ROUT OBSTE CARE,VAGINAL DELIV: ICD-10-PCS | Mod: ,,, | Performed by: OBSTETRICS & GYNECOLOGY

## 2022-02-22 PROCEDURE — 62326 NJX INTERLAMINAR LMBR/SAC: CPT | Performed by: STUDENT IN AN ORGANIZED HEALTH CARE EDUCATION/TRAINING PROGRAM

## 2022-02-22 PROCEDURE — U0002 COVID-19 LAB TEST NON-CDC: HCPCS | Performed by: OBSTETRICS & GYNECOLOGY

## 2022-02-22 PROCEDURE — 82570 ASSAY OF URINE CREATININE: CPT

## 2022-02-22 PROCEDURE — 76818 PRENATAL TESTING - NST/AFI: ICD-10-PCS | Mod: 26,,, | Performed by: OBSTETRICS & GYNECOLOGY

## 2022-02-22 PROCEDURE — 27200710 HC EPIDURAL INFUSION PUMP SET: Performed by: ANESTHESIOLOGY

## 2022-02-22 PROCEDURE — 59400 PRA FULL ROUT OBSTE CARE,VAGINAL DELIV: ICD-10-PCS | Mod: QY,,, | Performed by: ANESTHESIOLOGY

## 2022-02-22 PROCEDURE — 72100002 HC LABOR CARE, 1ST 8 HOURS

## 2022-02-22 PROCEDURE — 76818 FETAL BIOPHYS PROFILE W/NST: CPT

## 2022-02-22 PROCEDURE — 59400 OBSTETRICAL CARE: CPT | Mod: QY,,, | Performed by: ANESTHESIOLOGY

## 2022-02-22 PROCEDURE — 99999 PR PBB SHADOW E&M-EST. PATIENT-LVL III: ICD-10-PCS | Mod: PBBFAC,,, | Performed by: OBSTETRICS & GYNECOLOGY

## 2022-02-22 PROCEDURE — 0502F SUBSEQUENT PRENATAL CARE: CPT | Mod: CPTII,S$GLB,, | Performed by: OBSTETRICS & GYNECOLOGY

## 2022-02-22 PROCEDURE — 86850 RBC ANTIBODY SCREEN: CPT | Performed by: OBSTETRICS & GYNECOLOGY

## 2022-02-22 PROCEDURE — 86592 SYPHILIS TEST NON-TREP QUAL: CPT

## 2022-02-22 PROCEDURE — 11000001 HC ACUTE MED/SURG PRIVATE ROOM

## 2022-02-22 PROCEDURE — 85025 COMPLETE CBC W/AUTO DIFF WBC: CPT | Performed by: OBSTETRICS & GYNECOLOGY

## 2022-02-22 PROCEDURE — 63600175 PHARM REV CODE 636 W HCPCS: Performed by: ANESTHESIOLOGY

## 2022-02-22 PROCEDURE — 76818 FETAL BIOPHYS PROFILE W/NST: CPT | Mod: 26,,, | Performed by: OBSTETRICS & GYNECOLOGY

## 2022-02-22 PROCEDURE — 0502F PR SUBSEQUENT PRENATAL CARE: ICD-10-PCS | Mod: CPTII,S$GLB,, | Performed by: OBSTETRICS & GYNECOLOGY

## 2022-02-22 RX ORDER — DIPHENOXYLATE HYDROCHLORIDE AND ATROPINE SULFATE 2.5; .025 MG/1; MG/1
1 TABLET ORAL 4 TIMES DAILY PRN
Status: CANCELLED | OUTPATIENT
Start: 2022-02-22

## 2022-02-22 RX ORDER — PROCHLORPERAZINE EDISYLATE 5 MG/ML
5 INJECTION INTRAMUSCULAR; INTRAVENOUS EVERY 6 HOURS PRN
Status: DISCONTINUED | OUTPATIENT
Start: 2022-02-22 | End: 2022-02-24

## 2022-02-22 RX ORDER — SODIUM CHLORIDE 9 MG/ML
INJECTION, SOLUTION INTRAVENOUS
Status: DISCONTINUED | OUTPATIENT
Start: 2022-02-22 | End: 2022-02-22

## 2022-02-22 RX ORDER — OXYTOCIN/RINGER'S LACTATE 30/500 ML
0-30 PLASTIC BAG, INJECTION (ML) INTRAVENOUS CONTINUOUS
Status: DISCONTINUED | OUTPATIENT
Start: 2022-02-22 | End: 2022-02-24

## 2022-02-22 RX ORDER — ACETAMINOPHEN 325 MG/1
650 TABLET ORAL EVERY 6 HOURS PRN
Status: DISCONTINUED | OUTPATIENT
Start: 2022-02-22 | End: 2022-02-24 | Stop reason: HOSPADM

## 2022-02-22 RX ORDER — BETAMETHASONE SODIUM PHOSPHATE AND BETAMETHASONE ACETATE 3; 3 MG/ML; MG/ML
12 INJECTION, SUSPENSION INTRA-ARTICULAR; INTRALESIONAL; INTRAMUSCULAR; SOFT TISSUE
Status: DISCONTINUED | OUTPATIENT
Start: 2022-02-22 | End: 2022-02-22

## 2022-02-22 RX ORDER — MISOPROSTOL 100 UG/1
800 TABLET ORAL
Status: CANCELLED | OUTPATIENT
Start: 2022-02-22

## 2022-02-22 RX ORDER — HYDROCORTISONE 25 MG/G
CREAM TOPICAL 3 TIMES DAILY PRN
Status: DISCONTINUED | OUTPATIENT
Start: 2022-02-22 | End: 2022-02-24 | Stop reason: HOSPADM

## 2022-02-22 RX ORDER — HYDROCODONE BITARTRATE AND ACETAMINOPHEN 10; 325 MG/1; MG/1
1 TABLET ORAL EVERY 4 HOURS PRN
Status: DISCONTINUED | OUTPATIENT
Start: 2022-02-22 | End: 2022-02-24 | Stop reason: HOSPADM

## 2022-02-22 RX ORDER — BUPIVACAINE HYDROCHLORIDE 2.5 MG/ML
INJECTION, SOLUTION EPIDURAL; INFILTRATION; INTRACAUDAL
Status: DISPENSED
Start: 2022-02-22 | End: 2022-02-23

## 2022-02-22 RX ORDER — MISOPROSTOL 200 UG/1
800 TABLET ORAL
Status: DISCONTINUED | OUTPATIENT
Start: 2022-02-22 | End: 2022-02-22

## 2022-02-22 RX ORDER — SODIUM CITRATE AND CITRIC ACID MONOHYDRATE 334; 500 MG/5ML; MG/5ML
30 SOLUTION ORAL ONCE
Status: CANCELLED | OUTPATIENT
Start: 2022-02-22 | End: 2022-02-22

## 2022-02-22 RX ORDER — SIMETHICONE 80 MG
1 TABLET,CHEWABLE ORAL EVERY 6 HOURS PRN
Status: DISCONTINUED | OUTPATIENT
Start: 2022-02-22 | End: 2022-02-24 | Stop reason: HOSPADM

## 2022-02-22 RX ORDER — SODIUM CHLORIDE 9 MG/ML
INJECTION, SOLUTION INTRAVENOUS
Status: DISCONTINUED | OUTPATIENT
Start: 2022-02-22 | End: 2022-02-24

## 2022-02-22 RX ORDER — TRANEXAMIC ACID 100 MG/ML
1000 INJECTION, SOLUTION INTRAVENOUS ONCE AS NEEDED
Status: DISCONTINUED | OUTPATIENT
Start: 2022-02-22 | End: 2022-02-22

## 2022-02-22 RX ORDER — FENTANYL/BUPIVACAINE/NS/PF 2MCG/ML-.1
PLASTIC BAG, INJECTION (ML) INJECTION
Status: COMPLETED
Start: 2022-02-22 | End: 2022-02-22

## 2022-02-22 RX ORDER — CALCIUM CARBONATE 200(500)MG
500 TABLET,CHEWABLE ORAL 3 TIMES DAILY PRN
Status: DISCONTINUED | OUTPATIENT
Start: 2022-02-22 | End: 2022-02-22

## 2022-02-22 RX ORDER — METHYLERGONOVINE MALEATE 0.2 MG/ML
200 INJECTION INTRAVENOUS
Status: DISCONTINUED | OUTPATIENT
Start: 2022-02-22 | End: 2022-02-22

## 2022-02-22 RX ORDER — MUPIROCIN 20 MG/G
OINTMENT TOPICAL
Status: DISCONTINUED | OUTPATIENT
Start: 2022-02-22 | End: 2022-02-22

## 2022-02-22 RX ORDER — OXYTOCIN/RINGER'S LACTATE 30/500 ML
95 PLASTIC BAG, INJECTION (ML) INTRAVENOUS ONCE
Status: DISCONTINUED | OUTPATIENT
Start: 2022-02-22 | End: 2022-02-24 | Stop reason: HOSPADM

## 2022-02-22 RX ORDER — SODIUM CHLORIDE 9 MG/ML
INJECTION, SOLUTION INTRAVENOUS CONTINUOUS
Status: DISCONTINUED | OUTPATIENT
Start: 2022-02-22 | End: 2022-02-24

## 2022-02-22 RX ORDER — OXYTOCIN/RINGER'S LACTATE 30/500 ML
95 PLASTIC BAG, INJECTION (ML) INTRAVENOUS ONCE
Status: DISCONTINUED | OUTPATIENT
Start: 2022-02-22 | End: 2022-02-22

## 2022-02-22 RX ORDER — DOCUSATE SODIUM 100 MG/1
200 CAPSULE, LIQUID FILLED ORAL 2 TIMES DAILY PRN
Status: DISCONTINUED | OUTPATIENT
Start: 2022-02-22 | End: 2022-02-24 | Stop reason: HOSPADM

## 2022-02-22 RX ORDER — DIPHENHYDRAMINE HYDROCHLORIDE 50 MG/ML
25 INJECTION INTRAMUSCULAR; INTRAVENOUS EVERY 4 HOURS PRN
Status: DISCONTINUED | OUTPATIENT
Start: 2022-02-22 | End: 2022-02-24 | Stop reason: HOSPADM

## 2022-02-22 RX ORDER — SIMETHICONE 80 MG
1 TABLET,CHEWABLE ORAL 4 TIMES DAILY PRN
Status: DISCONTINUED | OUTPATIENT
Start: 2022-02-22 | End: 2022-02-22

## 2022-02-22 RX ORDER — MISOPROSTOL 200 UG/1
800 TABLET ORAL
Status: CANCELLED | OUTPATIENT
Start: 2022-02-22

## 2022-02-22 RX ORDER — HYDROCODONE BITARTRATE AND ACETAMINOPHEN 5; 325 MG/1; MG/1
1 TABLET ORAL EVERY 4 HOURS PRN
Status: DISCONTINUED | OUTPATIENT
Start: 2022-02-22 | End: 2022-02-24 | Stop reason: HOSPADM

## 2022-02-22 RX ORDER — ESCITALOPRAM OXALATE 10 MG/1
10 TABLET ORAL DAILY
Status: DISCONTINUED | OUTPATIENT
Start: 2022-02-22 | End: 2022-02-22

## 2022-02-22 RX ORDER — DIPHENHYDRAMINE HCL 25 MG
25 CAPSULE ORAL EVERY 4 HOURS PRN
Status: DISCONTINUED | OUTPATIENT
Start: 2022-02-22 | End: 2022-02-24 | Stop reason: HOSPADM

## 2022-02-22 RX ORDER — IBUPROFEN 600 MG/1
600 TABLET ORAL EVERY 6 HOURS
Status: DISCONTINUED | OUTPATIENT
Start: 2022-02-23 | End: 2022-02-24 | Stop reason: HOSPADM

## 2022-02-22 RX ORDER — PROCHLORPERAZINE EDISYLATE 5 MG/ML
5 INJECTION INTRAMUSCULAR; INTRAVENOUS EVERY 6 HOURS PRN
Status: DISCONTINUED | OUTPATIENT
Start: 2022-02-22 | End: 2022-02-24 | Stop reason: HOSPADM

## 2022-02-22 RX ORDER — ONDANSETRON 8 MG/1
8 TABLET, ORALLY DISINTEGRATING ORAL EVERY 8 HOURS PRN
Status: DISCONTINUED | OUTPATIENT
Start: 2022-02-22 | End: 2022-02-24 | Stop reason: HOSPADM

## 2022-02-22 RX ORDER — SODIUM CHLORIDE 0.9 % (FLUSH) 0.9 %
10 SYRINGE (ML) INJECTION
Status: DISCONTINUED | OUTPATIENT
Start: 2022-02-22 | End: 2022-02-24 | Stop reason: HOSPADM

## 2022-02-22 RX ORDER — ONDANSETRON 4 MG/1
8 TABLET, ORALLY DISINTEGRATING ORAL EVERY 8 HOURS PRN
Status: DISCONTINUED | OUTPATIENT
Start: 2022-02-22 | End: 2022-02-24

## 2022-02-22 RX ORDER — OXYTOCIN/RINGER'S LACTATE 30/500 ML
95 PLASTIC BAG, INJECTION (ML) INTRAVENOUS ONCE
Status: DISCONTINUED | OUTPATIENT
Start: 2022-02-22 | End: 2022-02-24

## 2022-02-22 RX ORDER — FENTANYL CITRATE 50 UG/ML
INJECTION, SOLUTION INTRAMUSCULAR; INTRAVENOUS
Status: DISCONTINUED | OUTPATIENT
Start: 2022-02-22 | End: 2022-02-22

## 2022-02-22 RX ORDER — SIMETHICONE 80 MG
1 TABLET,CHEWABLE ORAL 4 TIMES DAILY PRN
Status: DISCONTINUED | OUTPATIENT
Start: 2022-02-22 | End: 2022-02-24

## 2022-02-22 RX ORDER — PRENATAL WITH FERROUS FUM AND FOLIC ACID 3080; 920; 120; 400; 22; 1.84; 3; 20; 10; 1; 12; 200; 27; 25; 2 [IU]/1; [IU]/1; MG/1; [IU]/1; MG/1; MG/1; MG/1; MG/1; MG/1; MG/1; UG/1; MG/1; MG/1; MG/1; MG/1
1 TABLET ORAL DAILY
Status: DISCONTINUED | OUTPATIENT
Start: 2022-02-23 | End: 2022-02-24 | Stop reason: HOSPADM

## 2022-02-22 RX ORDER — METHYLERGONOVINE MALEATE 0.2 MG/ML
200 INJECTION INTRAVENOUS
Status: CANCELLED | OUTPATIENT
Start: 2022-02-22

## 2022-02-22 RX ORDER — CARBOPROST TROMETHAMINE 250 UG/ML
250 INJECTION, SOLUTION INTRAMUSCULAR
Status: CANCELLED | OUTPATIENT
Start: 2022-02-22

## 2022-02-22 RX ORDER — PROCHLORPERAZINE EDISYLATE 5 MG/ML
5 INJECTION INTRAMUSCULAR; INTRAVENOUS EVERY 6 HOURS PRN
Status: DISCONTINUED | OUTPATIENT
Start: 2022-02-22 | End: 2022-02-22

## 2022-02-22 RX ORDER — CARBOPROST TROMETHAMINE 250 UG/ML
250 INJECTION, SOLUTION INTRAMUSCULAR
Status: DISCONTINUED | OUTPATIENT
Start: 2022-02-22 | End: 2022-02-22

## 2022-02-22 RX ORDER — LIDOCAINE HYDROCHLORIDE AND EPINEPHRINE 15; 5 MG/ML; UG/ML
INJECTION, SOLUTION EPIDURAL
Status: DISCONTINUED | OUTPATIENT
Start: 2022-02-22 | End: 2022-02-22

## 2022-02-22 RX ORDER — FENTANYL/BUPIVACAINE/NS/PF 2MCG/ML-.1
PLASTIC BAG, INJECTION (ML) INJECTION CONTINUOUS
Status: CANCELLED | OUTPATIENT
Start: 2022-02-22

## 2022-02-22 RX ORDER — OXYTOCIN/RINGER'S LACTATE 30/500 ML
334 PLASTIC BAG, INJECTION (ML) INTRAVENOUS ONCE
Status: DISCONTINUED | OUTPATIENT
Start: 2022-02-22 | End: 2022-02-22

## 2022-02-22 RX ORDER — ONDANSETRON 8 MG/1
8 TABLET, ORALLY DISINTEGRATING ORAL EVERY 8 HOURS PRN
Status: DISCONTINUED | OUTPATIENT
Start: 2022-02-22 | End: 2022-02-22

## 2022-02-22 RX ORDER — FENTANYL/BUPIVACAINE/NS/PF 2MCG/ML-.1
PLASTIC BAG, INJECTION (ML) INJECTION CONTINUOUS PRN
Status: DISCONTINUED | OUTPATIENT
Start: 2022-02-22 | End: 2022-02-22

## 2022-02-22 RX ORDER — OXYTOCIN/RINGER'S LACTATE 30/500 ML
0-30 PLASTIC BAG, INJECTION (ML) INTRAVENOUS CONTINUOUS
Status: DISCONTINUED | OUTPATIENT
Start: 2022-02-22 | End: 2022-02-22

## 2022-02-22 RX ORDER — FAMOTIDINE 10 MG/ML
20 INJECTION INTRAVENOUS ONCE
Status: CANCELLED | OUTPATIENT
Start: 2022-02-22 | End: 2022-02-22

## 2022-02-22 RX ORDER — MUPIROCIN 20 MG/G
OINTMENT TOPICAL
Status: CANCELLED | OUTPATIENT
Start: 2022-02-22

## 2022-02-22 RX ORDER — CALCIUM CARBONATE 200(500)MG
500 TABLET,CHEWABLE ORAL 3 TIMES DAILY PRN
Status: DISCONTINUED | OUTPATIENT
Start: 2022-02-22 | End: 2022-02-24

## 2022-02-22 RX ORDER — OXYTOCIN/RINGER'S LACTATE 30/500 ML
334 PLASTIC BAG, INJECTION (ML) INTRAVENOUS ONCE
Status: DISCONTINUED | OUTPATIENT
Start: 2022-02-22 | End: 2022-02-24

## 2022-02-22 RX ORDER — SODIUM CHLORIDE, SODIUM LACTATE, POTASSIUM CHLORIDE, CALCIUM CHLORIDE 600; 310; 30; 20 MG/100ML; MG/100ML; MG/100ML; MG/100ML
INJECTION, SOLUTION INTRAVENOUS CONTINUOUS
Status: DISCONTINUED | OUTPATIENT
Start: 2022-02-22 | End: 2022-02-22

## 2022-02-22 RX ORDER — DIPHENOXYLATE HYDROCHLORIDE AND ATROPINE SULFATE 2.5; .025 MG/1; MG/1
1 TABLET ORAL 4 TIMES DAILY PRN
Status: DISCONTINUED | OUTPATIENT
Start: 2022-02-22 | End: 2022-02-22

## 2022-02-22 RX ORDER — FENTANYL CITRATE 50 UG/ML
INJECTION, SOLUTION INTRAMUSCULAR; INTRAVENOUS
Status: COMPLETED
Start: 2022-02-22 | End: 2022-02-22

## 2022-02-22 RX ADMIN — DOCUSATE SODIUM 200 MG: 100 CAPSULE, LIQUID FILLED ORAL at 08:02

## 2022-02-22 RX ADMIN — Medication 10 ML/HR: at 03:02

## 2022-02-22 RX ADMIN — BETAMETHASONE SODIUM PHOSPHATE AND BETAMETHASONE ACETATE 12 MG: 3; 3 INJECTION, SUSPENSION INTRA-ARTICULAR; INTRALESIONAL; INTRAMUSCULAR at 12:02

## 2022-02-22 RX ADMIN — Medication 2 MILLI-UNITS/MIN: at 12:02

## 2022-02-22 RX ADMIN — LIDOCAINE HYDROCHLORIDE,EPINEPHRINE BITARTRATE 3 ML: 15; .005 INJECTION, SOLUTION EPIDURAL; INFILTRATION; INTRACAUDAL; PERINEURAL at 03:02

## 2022-02-22 RX ADMIN — SODIUM CHLORIDE, SODIUM LACTATE, POTASSIUM CHLORIDE, AND CALCIUM CHLORIDE: .6; .31; .03; .02 INJECTION, SOLUTION INTRAVENOUS at 03:02

## 2022-02-22 RX ADMIN — SODIUM CHLORIDE, SODIUM LACTATE, POTASSIUM CHLORIDE, AND CALCIUM CHLORIDE 1000 ML: .6; .31; .03; .02 INJECTION, SOLUTION INTRAVENOUS at 02:02

## 2022-02-22 RX ADMIN — Medication 10 ML: at 03:02

## 2022-02-22 RX ADMIN — FENTANYL CITRATE 100 MCG: 50 INJECTION, SOLUTION INTRAMUSCULAR; INTRAVENOUS at 03:02

## 2022-02-22 NOTE — ANESTHESIA PROCEDURE NOTES
Epidural    Patient location during procedure: OB   Reason for block: primary anesthetic   Reason for block: labor analgesia requested by patient and obstetrician  Diagnosis: iup in labor   Start time: 2/22/2022 2:44 PM  Timeout: 2/22/2022 2:40 PM  End time: 2/22/2022 2:59 PM  Surgery related to: Vaginal Delivery    Staffing  Performing Provider: Cristopher Ferreira MD  Authorizing Provider: Brittany Fox MD        Preanesthetic Checklist  Completed: patient identified, IV checked, site marked, risks and benefits discussed, surgical consent, monitors and equipment checked, pre-op evaluation, timeout performed, anesthesia consent given, hand hygiene performed and patient being monitored  Preparation  Patient position: sitting  Prep: ChloraPrep  Patient monitoring: Pulse Ox  Reason for block: primary anesthetic   Epidural  Skin Anesthetic: lidocaine 1%  Skin Wheal: 3 mL  Administration type: continuous  Approach: midline  Interspace: L3-4    Injection technique: RONDA saline  Needle and Epidural Catheter  Needle type: Tuohy   Needle gauge: 17  Needle length: 3.5 inches  Needle insertion depth: 6 cm  Catheter type: AI Exchange  Catheter size: 19 G  Catheter at skin depth: 10 cm  Insertion Attempts: 1  Test dose: 3 mL of lidocaine 1.5% with Epi 1-to-200,000  Additional Documentation: incremental injection, negative aspiration for heme and CSF, no paresthesia on injection, no signs/symptoms of IV or SA injection, no significant pain on injection and no significant complaints from patient  Needle localization: anatomical landmarks  Medications:  Volume per aspiration: 5 mL  Time between aspirations: 5 minutes   Assessment  Ease of block: easy  Patient's tolerance of the procedure: comfortable throughout block and no complaints No inadvertent dural puncture with Tuohy.  Dural puncture performed with spinal needle.
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
## 2022-02-22 NOTE — INTERVAL H&P NOTE
Mable Philip is 36 y.o.  at 36w1d wga presenting for IOL in the setting of category 2 tracing and preE w/o SF.     FHT: 145 bpm, moderate BTBV, +accels, -decels; Cat 1 (reassuring)  Enon: irritability  Presentation: cephalic by ultrasound    SVE: /-3, cruz balloon placed without difficulty.    Plan for cruz and pitocin.  Will administer BMZ.    Karishma Doty MD  OBGYN, PGY-1        Active Hospital Problems    Diagnosis  POA    *Encounter for induction of labor [Z34.90]  Not Applicable    Category II fetal heart rate tracing during maternal care in third trimester [O36.8330]  Unknown      Resolved Hospital Problems   No resolved problems to display.

## 2022-02-22 NOTE — ANESTHESIA PREPROCEDURE EVALUATION
[facility]  Anesthesia Pre-Operative Evaluation         Patient Name: Mable Philip  YOB: 1985  MRN: [id number]    2022      Mable Philip is a 36 y.o. female  @ 36w1d who presents for IOL 2/2 non-reassuring fetal status. Patient has pre-E without SF. 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 Denies cHTN, asthma, DM, bleeding diathesis, anticoag, spinal d/o or prior spinal surgery.    OB History    Para Term  AB Living   3 1   1 1 1   SAB IAB Ectopic Multiple Live Births   1     0 1      # Outcome Date GA Lbr Flavio/2nd Weight Sex Delivery Anes PTL Lv   3 Current            2  03/10/18 36w3d 12:54 / 00:53 2.56 kg (5 lb 10.3 oz) F Vag-Spont EPI Y PAYAL   1 SAB                Review of patient's allergies indicates:  No Known Allergies    Wt Readings from Last 1 Encounters:   22 0919 83 kg (182 lb 15.7 oz)       BP Readings from Last 3 Encounters:   22 (!) 142/90   22 (!) 144/100   22 124/83       Patient Active Problem List   Diagnosis    Oligomenorrhea    Gestational hypertension affecting first pregnancy    Retained products of conception after miscarriage    Bicornuate uterus    Bicornuate uterus affecting pregnancy, antepartum    Antepartum multigravida of advanced maternal age    History of gestational hypertension    Encounter for induction of labor    Category II fetal heart rate tracing during maternal care in third trimester       Past Surgical History:   Procedure Laterality Date    DENTAL IMPLANTS  2011    HYSTEROSCOPY N/A 2020    Procedure: HYSTEROSCOPY;  Surgeon: Adenike Mora MD;  Location[facility];  Service: OB/GYN;  Laterality: N/A;    WISDOM TOOTH EXTRACTION         Social History     Socioeconomic History    Marital status:    Tobacco Use    Smoking status: Never Smoker     Smokeless tobacco: Never Used   Substance and Sexual Activity    Alcohol use: Yes     Comment: Socially    Drug use: No    Sexual activity: Yes     Partners: Male     Comment: :  Patel         Chemistry        Component Value Date/Time     02/09/2022 1537    K 3.8 02/09/2022 1537     02/09/2022 1537    CO2 23 02/09/2022 1537    BUN 10 02/09/2022 1537    CREATININE 0.7 02/09/2022 1537     (H) 02/09/2022 1537        Component Value Date/Time    CALCIUM 9.1 02/09/2022 1537    ALKPHOS 143 (H) 02/09/2022 1537    AST 22 02/09/2022 1537    ALT 15 02/09/2022 1537    BILITOT 0.3 02/09/2022 1537    ESTGFRAFRICA >60 02/09/2022 1537    EGFRNONAA >60 02/09/2022 1537            Lab Results   Component Value Date    WBC 11.44 02/09/2022    HGB 11.4 (L) 02/09/2022    HCT 34.5 (L) 02/09/2022    MCV 91 02/09/2022     02/09/2022       No results for input(s): PT, INR, PROTIME, APTT in the last 72 hours.          Pre-op Assessment    I have reviewed the Patient Summary Reports.    I have reviewed the NPO Status.   I have reviewed the Medications.     Review of Systems  Anesthesia Hx:  No problems with previous Anesthesia  Denies Family Hx of Anesthesia complications.   Denies Personal Hx of Anesthesia complications.   Social:  Non-Smoker    Hematology/Oncology:  Hematology Normal   Oncology Normal     EENT/Dental:EENT/Dental Normal   Cardiovascular:   Hypertension    Pulmonary:  Pulmonary Normal    Renal/:  Renal/ Normal     Hepatic/GI:  Hepatic/GI Normal    Musculoskeletal:  Musculoskeletal Normal    Neurological:  Neurology Normal    Endocrine:  Endocrine Normal    Psych:   depression          Physical Exam  General: Well nourished    Airway:  Mouth Opening: Normal    Dental:  Intact    Chest/Lungs:  Normal Respiratory Rate    Heart:  Rate: Normal  Rhythm: Regular Rhythm        Anesthesia Plan  Type of Anesthesia, risks & benefits discussed:    Anesthesia Type: Gen ETT, MAC, Epidural, Spinal,  CSE  Intra-op Monitoring Plan: Standard ASA Monitors  Post Op Pain Control Plan: multimodal analgesia, IV/PO Opioids PRN, epidural analgesia and intrathecal opioid  Informed Consent: Informed consent signed with the Patient and all parties understand the risks and agree with anesthesia plan.  All questions answered.   ASA Score: 2  Day of Surgery Review of History & Physical: H&P Update referred to the surgeon/provider.    Ready For Surgery From Anesthesia Perspective.     .

## 2022-02-22 NOTE — PROGRESS NOTES
"LABOR NOTE    S:  Complaints: No.  Epidural working:  yes      O: /84   Pulse 81   Temp 98 °F (36.7 °C)   Resp 17   Ht 5' 5" (1.651 m)   Wt 83 kg (182 lb 15.7 oz)   LMP 05/03/2021   SpO2 100%   Breastfeeding Yes   BMI 30.45 kg/m²     FHT: baseline 140, moderate BTBV, + accels, no decels Cat 1 (reassuring)  CTX: q irregular minutes  SVE: 10/100/+1    PLAN:  Continue Close Maternal/Fetal Monitoring  Pitocin Augmentation per protocol  Staff notified    Ananya Raymundo MD  PGY-1 OB/GYN      "

## 2022-02-22 NOTE — L&D DELIVERY NOTE
[facility] - Labor & Delivery  Vaginal Delivery   Operative Note    SUMMARY     Normal spontaneous vaginal delivery of live infant, was placed on mothers abdomen for skin to skin and bulb suctioning performed.  Infant delivered position LENA over intact perineum.  Nuchal cord: No.    Spontaneous delivery of placenta and IV pitocin given noting good uterine tone.  2nd degree laceration noted and repaired in normal fashion with 2-0 vicryl suture.     Patient tolerated delivery well. Sponge needle and lap counted correctly x2.    Placenta was carefully noted and succenturiate lobe was completely present with no concerns for any retained placental tissue.    Indications: Encounter for induction of labor  Pregnancy complicated by:   Patient Active Problem List   Diagnosis    Oligomenorrhea    Gestational hypertension affecting first pregnancy    Retained products of conception after miscarriage    Bicornuate uterus    Bicornuate uterus affecting pregnancy, antepartum    Antepartum multigravida of advanced maternal age    History of gestational hypertension    Encounter for induction of labor    Category II fetal heart rate tracing during maternal care in third trimester     Admitting GA: 36w1d    This patient has no babies on file.
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
## 2022-02-22 NOTE — PROGRESS NOTES
36w1d without major complaints.   Sent from prenatal testing with recommendation from [facility] for delivery today.   Sent to L&D for IOL - see H&P for details.

## 2022-02-23 LAB
BASOPHILS # BLD AUTO: 0.02 K/UL (ref 0–0.2)
BASOPHILS NFR BLD: 0.1 % (ref 0–1.9)
DIFFERENTIAL METHOD: ABNORMAL
EOSINOPHIL # BLD AUTO: 0 K/UL (ref 0–0.5)
EOSINOPHIL NFR BLD: 0 % (ref 0–8)
ERYTHROCYTE [DISTWIDTH] IN BLOOD BY AUTOMATED COUNT: 14.5 % (ref 11.5–14.5)
HCT VFR BLD AUTO: 32.4 % (ref 37–48.5)
HGB BLD-MCNC: 10.8 G/DL (ref 12–16)
IMM GRANULOCYTES # BLD AUTO: 0.08 K/UL (ref 0–0.04)
IMM GRANULOCYTES NFR BLD AUTO: 0.5 % (ref 0–0.5)
LYMPHOCYTES # BLD AUTO: 2.2 K/UL (ref 1–4.8)
LYMPHOCYTES NFR BLD: 12.8 % (ref 18–48)
MCH RBC QN AUTO: 29.8 PG (ref 27–31)
MCHC RBC AUTO-ENTMCNC: 33.3 G/DL (ref 32–36)
MCV RBC AUTO: 90 FL (ref 82–98)
MONOCYTES # BLD AUTO: 1 K/UL (ref 0.3–1)
MONOCYTES NFR BLD: 5.9 % (ref 4–15)
NEUTROPHILS # BLD AUTO: 14 K/UL (ref 1.8–7.7)
NEUTROPHILS NFR BLD: 80.7 % (ref 38–73)
NRBC BLD-RTO: 0 /100 WBC
PLATELET # BLD AUTO: 221 K/UL (ref 150–450)
PMV BLD AUTO: 11.4 FL (ref 9.2–12.9)
RBC # BLD AUTO: 3.62 M/UL (ref 4–5.4)
RPR SER QL: NORMAL
WBC # BLD AUTO: 17.36 K/UL (ref 3.9–12.7)

## 2022-02-23 PROCEDURE — 25000003 PHARM REV CODE 250: Performed by: OBSTETRICS & GYNECOLOGY

## 2022-02-23 PROCEDURE — 11000001 HC ACUTE MED/SURG PRIVATE ROOM

## 2022-02-23 PROCEDURE — 85025 COMPLETE CBC W/AUTO DIFF WBC: CPT | Performed by: OBSTETRICS & GYNECOLOGY

## 2022-02-23 PROCEDURE — 36415 COLL VENOUS BLD VENIPUNCTURE: CPT | Performed by: OBSTETRICS & GYNECOLOGY

## 2022-02-23 RX ORDER — BUPROPION HYDROCHLORIDE 150 MG/1
300 TABLET ORAL DAILY
Status: DISCONTINUED | OUTPATIENT
Start: 2022-02-23 | End: 2022-02-24 | Stop reason: HOSPADM

## 2022-02-23 RX ORDER — CLONAZEPAM 0.5 MG/1
0.5 TABLET ORAL DAILY
Status: DISCONTINUED | OUTPATIENT
Start: 2022-02-23 | End: 2022-02-24 | Stop reason: HOSPADM

## 2022-02-23 RX ORDER — ESCITALOPRAM OXALATE 10 MG/1
10 TABLET ORAL DAILY
Status: DISCONTINUED | OUTPATIENT
Start: 2022-02-23 | End: 2022-02-24 | Stop reason: HOSPADM

## 2022-02-23 RX ADMIN — ESCITALOPRAM OXALATE 10 MG: 10 TABLET ORAL at 11:02

## 2022-02-23 RX ADMIN — IBUPROFEN 600 MG: 600 TABLET ORAL at 06:02

## 2022-02-23 RX ADMIN — PRENATAL VIT W/ FE FUMARATE-FA TAB 27-0.8 MG 1 TABLET: 27-0.8 TAB at 10:02

## 2022-02-23 RX ADMIN — HYDROCODONE BITARTRATE AND ACETAMINOPHEN 1 TABLET: 5; 325 TABLET ORAL at 02:02

## 2022-02-23 RX ADMIN — CLONAZEPAM 0.5 MG: 0.5 TABLET ORAL at 11:02

## 2022-02-23 RX ADMIN — IBUPROFEN 600 MG: 600 TABLET ORAL at 12:02

## 2022-02-23 RX ADMIN — IBUPROFEN 600 MG: 600 TABLET ORAL at 11:02

## 2022-02-23 RX ADMIN — DOCUSATE SODIUM 200 MG: 100 CAPSULE, LIQUID FILLED ORAL at 10:02

## 2022-02-23 RX ADMIN — BUPROPION HYDROCHLORIDE 300 MG: 150 TABLET, FILM COATED, EXTENDED RELEASE ORAL at 11:02

## 2022-02-23 NOTE — DISCHARGE INSTRUCTIONS
Preparation and Hygiene:    Shower daily.  Wear a clean bra each day and wash daily in warm soapy water.  Change wet or moist breast pads frequently.  Moist pads can promote growth of germs.  Actively wash your hands, paying close attention to the area around and under your fingernails, thoroughly with soap and water for 15 seconds before pumping or handling your milk.  Re-wash your hands if you touch anything (scratching your nose, answering the phone, etc) while pumping or handling your milk.   Before pumping your breasts, assemble the pump collection kit and have ready the sterile container and labels.  Place these items on a clean surface next to the breastpump.  Each time after you have finished pumping, take apart all of the parts of the breastpump collection kit and place them in a separate cleaning container (do not place them in the sink).  Be sure to remove the yellow valve from the breastshield and separate the white membrane from the yellow valve.  Rinse all of these parts with cool water.  Then use a new sponge and/or bottle brush and dishwashing detergent to clean the parts.  Rinse off the soapy water with cool water and air dry on a clean towel covered with a clean cloth.  All parts may also be washed after each use in the top rack of a .  Once each day, sterilize all of the parts of the breastpump collection kit.  This can be done by boiling the kit parts for 10 minutes or by using a Quick Clean Micro-Steam Bag made by [employer]  If condensation appears in the tubing, continue to run the pump with the tubing attached for 1-2 minutes or until the tubing is dry.   Notify your babys nurse or doctor if you become ill or need to take any medication, even over-the-counter medicines.        Collection and Storage of Expressed Breastmilk:         1. Pump your breasts at least 8-10 times every 24 hours.  Double pump (both breasts at  the same time) for at least 15-20 minutes using the most  suction that is comfortable.    2. Write the date and time of pumping and the name of any medications you are takingon the babys pre-printed hospital identification label.   3. Place your babys pre-printed hospital identification label on each container of breastmilk.  Additional pre-printed labels can be obtained from your babys nurse.  If your expressed breastmilk is not correctly labeled, the nurse cannot feed the milk to the baby.       4. Place a brightly colored sticker on the top of each container of milk pumped during the first 30 days.  This identifies the milk as special and having higher levels of nutrients and anti-infective properties that are so important for your baby.  Additional stickers can be obtained from the lactation consultants or your babys nurse.  5.   Do not touch the inside of the storage containers or lids.  6.      Pour the amount of expressed milk needed for 1 of your babys feedings into each   storage container. Use a new container(s) for each pumping.  Additional storage   containers can be obtained from your babys nurse.        7.       Tightly screw the lid onto the container and place immediately into the       refrigerator fordaily transportation to the hospital.   Do not freeze your milk      unless asked to do so by your babys nurse.  However, if you are not able to      visit your baby each day, place the expressed breastmilk in the freezer.  8.   Expressed breastmilk should be refrigerated or frozen within 1 hour of      pumping.  9.      Do not store expressed breastmilk on the door of your refrigerator or freezer where the temperature is warmer.         Transportation of Expressed Breastmilk:    Refrigerated breastmilk or frozen milk should be packed tightly together in a cooler with frozen, blue gel-packs to keep the milk frozen.  DO NOT USE ICE CUBES (WET ICE) TO TRANSPORT FROZEN MILK.   A clean towel can be used to fill any extra space between containers of  frozen milk.  2.    Bring your expressed milk from home each time you visit the baby.                   Discharge Instructions    The AAP recommends exclusive breastfeeding for about the first 6 months of age and as solid foods are introduced, continued breastfeeding  for at least 1 year or longer.    Feed the baby at the earliest sign of hunger or comfort (see signs on the back cover of the Mother's Breastfeeding Guide)  Hands to mouth, sucking motions  Rooting or searching for something to suck on  Dont wait for crying - it is a sign of distress    The feedings may be 8-12 times per 24hrs and will not follow a schedule  Avoid pacifiers and bottles for the first 4 weeks  Alternate the breast you start the feeding with, or start with the breast that feels the fullest  Switch breasts when the baby takes himself off the breast or falls asleep  Keep offering breasts until the baby looks full, no longer gives hunger signs, and stays asleep when placed on his back in the crib  If the baby is sleepy and wont wake for a feeding, put the baby skin-to-skin dressed in a diaper against the mothers bare chest  Sleep with your baby in your room near you  The baby should be positioned and latched on to the breast correctly  Chest-to-chest, chin in the breast  Babys lips are flipped outward  Babys mouth is stretched open wide like a shout  Babys sucking should feel like tugging to the mother  The baby should be drinking at the breast:  You should hear swallowing or gulping throughout the feeding  You should see milk on the babys lips when he comes off the breast  Your breasts should be softer when the baby is finished feeding  The baby should look relaxed at the end of feedings  After the 4th day and your milk is in:  The babys poop should turn bright yellow and be loose, watery, and seedy  The baby should have at least 3-4 poops the size of the palm of your hand per day  The baby should have at least 5-6 wet diapers  per day  The urine should be light yellow in color  You should drink when you are thirsty and eat a healthy diet when you are    hungry.     Take naps to get the rest you need.   Take medications and/or drink alcohol only with permission of your obstetrician    or the babys pediatrician.  You can also call the [facility] Risk [facility],   ([phone]), Monday-Friday, 8am-5pm Central time, to get the most   up-to-date evidence-based information on the use of medications during   pregnancy and breastfeeding.      Complete the First Alert form in the Mother's Breastfeeding Guide 3-5 days after the baby's birth.  Please call the Breastfeeding Warmline ([phone]) or the baby's pediatrician if you have any concerns.    The baby should be examined by a pediatrician at 3-5 days of age and again at 2 weeks of age.  Once your milk production increases, the baby should be gaining at least ½ - 1oz each day and should be back to birthweight no later than 10-14 days of age.  If this is not the case, please call the Breastfeeding Warmline (031-280-8788) for assistance and support.    Community Resources    [facility] Breastfeeding Warmline: [phone]   Local [facility] clinics: provide incentives and breastpumps to eligible mothers 6-291-871-BABY  La Leche League International (LLLI):  mother-to-mother support group website        www.lll.org  Local La Leche League mother-to-mother support groups:        www.lllAdaptive Biotechnologies.Shutl        La Leche League [facility]   Dr. Jacob Barahona website for latch videos and general information:        www.breastfeedinginc.ca  [facility] Risk [facility] is a call center that provides information about the safety of taking medications while breastfeeding.  Call 3-072-999-3535, M-F, 8am-5pm, [place].  International Lactation Consultant Association provides resources for assistance:        www.ilca.org  [place] Breastfeeding Coalition provides informationand resources for parents  and the  community    www.LaBreastfeedingSupport.org  Sadaf Alfaro is a mom-to-mom support group:                             www.nolanesting.com//breastfeedng-support/  [employer] for Healthy Babies:  6-598-143-BABY(0727)  [employer]: a breastfeeding support group for women of color, 114.677.4496

## 2022-02-23 NOTE — PROGRESS NOTES
HTN education given with handout, pt states understanding and will send BP through connective mom in 3-5 days.

## 2022-02-23 NOTE — PLAN OF CARE
Problem: Infection  Goal: Absence of Infection Signs and Symptoms  Outcome: Ongoing, Progressing     Problem: Pain Acute  Goal: Acceptable Pain Control and Functional Ability  Outcome: Ongoing, Progressing     Problem: Adjustment to Role Transition (Postpartum Vaginal Delivery)  Goal: Successful Maternal Role Transition  Outcome: Ongoing, Progressing     Problem: Bleeding (Postpartum Vaginal Delivery)  Goal: Hemostasis  Outcome: Ongoing, Progressing     Problem: Infection (Postpartum Vaginal Delivery)  Goal: Absence of Infection Signs/Symptoms  2/23/2022 1456 by Teo Hatfield RN  Outcome: Ongoing, Progressing  2/23/2022 1456 by Teo Hatfield RN  Outcome: Ongoing, Progressing     Problem: Pain (Postpartum Vaginal Delivery)  Goal: Acceptable Pain Control  2/23/2022 1456 by Teo Hatfield RN  Outcome: Ongoing, Progressing  2/23/2022 1456 by Teo Hatfield RN  Outcome: Ongoing, Progressing     Problem: Urinary Retention (Postpartum Vaginal Delivery)  Goal: Effective Urinary Elimination  2/23/2022 1456 by Teo Hatfield RN  Outcome: Ongoing, Progressing  2/23/2022 1456 by Toe Hatfield RN  Outcome: Ongoing, Progressing     Problem: Anxiety  Goal: Anxiety Reduction or Resolution  Outcome: Ongoing, Progressing

## 2022-02-23 NOTE — PLAN OF CARE
Problem: Adult Inpatient Plan of Care  Goal: Plan of Care Review  Outcome: Ongoing, Progressing    Problem: Bleeding (Postpartum Vaginal Delivery)  Goal: Hemostasis  Outcome: Ongoing, Progressing     Problem: Pain (Postpartum Vaginal Delivery)  Goal: Acceptable Pain Control  Outcome: Ongoing, Progressing   POC reviewed with pt who verbalized understanding. VSS. Remains free of falls and injury. Pain managed. Up independently in room. Pump provided and mom said she will start in the AM. Visited baby in NICU. Resting well between care. Will continue to monitor.

## 2022-02-23 NOTE — PROGRESS NOTES
POSTPARTUM PROGRESS NOTE    Subjective:     PPD/POD#: 1   Procedure:    EGA: 36w1d   N/V: No   F/C: No   Abd Pain: Mild, well-controlled with oral pain medication   Lochia: Mild   Voiding: Yes   Ambulating: Yes   Bowel fnc: Yes   Breastfeeding: Yes - pumping for baby in NICU   Contraception: Per primary OB   Circumcision: N/A, female - baby in NICU     Objective:      Temp:  [97.8 °F (36.6 °C)-98.5 °F (36.9 °C)] 97.8 °F (36.6 °C)  Pulse:  [] 116  Resp:  [17-18] 18  SpO2:  [96 %-100 %] 98 %  BP: (122-164)/() 145/75    Lung: Normal respiratory effort   Abdomen: Soft, appropriately tender   Uterus: Firm, no fundal tenderness   Incision: N/A   : Deferred   Extremities: Bilateral trace edema     Lab Review    Recent Labs   Lab 22  1215      K 4.2      CO2 23   BUN 9   CREATININE 0.7   GLU 74   PROT 6.6   BILITOT 0.3   ALKPHOS 164*   ALT 14   AST 20       Recent Labs   Lab 22  1215 22  0548   WBC 9.17 17.36*   HGB 12.1 10.8*   HCT 36.9* 32.4*   MCV 90 90    221         I/O    Intake/Output Summary (Last 24 hours) at 2022 1039  Last data filed at 2022 1710  Gross per 24 hour   Intake 3.6 ml   Output 240 ml   Net -236.4 ml        Assessment and Plan:   Postpartum care:  - Patient doing well.  - Continue routine management and advances.  - Baby intubated in NICU    Mood Disorder  - Mood currently stable, appropriately handling baby in NICU  - Medications: will restart home regimen of Lexapro, Wellbutrin, and Klonopin   - Will need 1-2 week postpartum mood check    PreE w/o SF  - BP as above  - asymptomatic  - preE labs as above  - Mag: not indicated  - Hypertensive agent not indicated at this time.       Negra Roy

## 2022-02-23 NOTE — LACTATION NOTE
02/23/22 1645   Maternal Infant Feeding   Maternal Emotional State anxious   Equipment Type   Breast Pump Type double electric, hospital grade   Breast Pump Flange Type hard   Breast Pump Flange Size 24 mm   Breast Pumping   Breast Pumping Interventions frequent pumping encouraged;early pumping promoted   Breast Pumping other (see comments)  (encouraged to pump 8 or more times in 24hrs)   Lactation Referrals   Lactation Referrals outpatient lactation program       Reviewed basic NICU education with NICU guide, encouraged to pump 8 or more times in 24hrs, hand express after pumping. Mother to look into which pump she has ordered from her insurance. She may rent pump.     She has pumped 3-4 times and expressed drops. Reviewed labeling, milk storage. To take EBM to NICU soon; otherwise have staff place in breastmilk fridge. LC number on board.

## 2022-02-23 NOTE — ANESTHESIA POSTPROCEDURE EVALUATION
Anesthesia Post Evaluation    Patient: Mable Philip    Procedure(s) Performed: * No procedures listed *    Final Anesthesia Type: epidural      Patient location during evaluation: labor & delivery  Patient participation: Yes- Able to Participate  Level of consciousness: awake and alert, awake and oriented  Post-procedure vital signs: reviewed and stable  Pain management: adequate  Airway patency: patent  HOA mitigation strategies: Verification of full reversal of neuromuscular block, Extubation while patient is awake and Multimodal analgesia  PONV status at discharge: No PONV  Anesthetic complications: no      Cardiovascular status: blood pressure returned to baseline  Respiratory status: unassisted  Hydration status: euvolemic  Follow-up not needed.          Vitals Value Taken Time   /91 02/23/22 1653   Temp 36.5 °C (97.7 °F) 02/23/22 1653   Pulse 93 02/23/22 1653   Resp 18 02/23/22 1653   SpO2 94 % 02/23/22 1653         No case tracking events are documented in the log.      Pain/Nova Score: Pain Rating Prior to Med Admin: 3 (2/23/2022  2:01 PM)  Pain Rating Post Med Admin: 0 (2/23/2022  2:00 AM)

## 2022-02-24 VITALS
HEIGHT: 65 IN | OXYGEN SATURATION: 97 % | TEMPERATURE: 98 F | WEIGHT: 182 LBS | DIASTOLIC BLOOD PRESSURE: 87 MMHG | BODY MASS INDEX: 30.32 KG/M2 | RESPIRATION RATE: 18 BRPM | SYSTOLIC BLOOD PRESSURE: 140 MMHG | HEART RATE: 96 BPM

## 2022-02-24 PROCEDURE — 25000003 PHARM REV CODE 250: Performed by: OBSTETRICS & GYNECOLOGY

## 2022-02-24 PROCEDURE — 99024 PR POST-OP FOLLOW-UP VISIT: ICD-10-PCS | Mod: ,,, | Performed by: OBSTETRICS & GYNECOLOGY

## 2022-02-24 PROCEDURE — 99024 POSTOP FOLLOW-UP VISIT: CPT | Mod: ,,, | Performed by: OBSTETRICS & GYNECOLOGY

## 2022-02-24 RX ORDER — CLONAZEPAM 0.5 MG/1
0.5 TABLET ORAL DAILY
Qty: 30 TABLET | Refills: 1 | Status: SHIPPED | OUTPATIENT
Start: 2022-02-25 | End: 2023-02-25

## 2022-02-24 RX ORDER — HYDROCODONE BITARTRATE AND ACETAMINOPHEN 5; 325 MG/1; MG/1
1 TABLET ORAL EVERY 4 HOURS PRN
Qty: 30 TABLET | Refills: 0 | Status: SHIPPED | OUTPATIENT
Start: 2022-02-24

## 2022-02-24 RX ORDER — BUPROPION HYDROCHLORIDE 300 MG/1
300 TABLET ORAL DAILY
Qty: 30 TABLET | Refills: 1 | Status: SHIPPED | OUTPATIENT
Start: 2022-02-25 | End: 2023-02-25

## 2022-02-24 RX ORDER — IBUPROFEN 600 MG/1
600 TABLET ORAL EVERY 6 HOURS PRN
Qty: 30 TABLET | Refills: 1 | Status: SHIPPED | OUTPATIENT
Start: 2022-02-24

## 2022-02-24 RX ORDER — DOCUSATE SODIUM 100 MG/1
100 CAPSULE, LIQUID FILLED ORAL 2 TIMES DAILY PRN
Qty: 60 CAPSULE | Refills: 1 | Status: SHIPPED | OUTPATIENT
Start: 2022-02-24

## 2022-02-24 RX ADMIN — IBUPROFEN 600 MG: 600 TABLET ORAL at 07:02

## 2022-02-24 RX ADMIN — ESCITALOPRAM OXALATE 10 MG: 10 TABLET ORAL at 08:02

## 2022-02-24 RX ADMIN — PRENATAL VIT W/ FE FUMARATE-FA TAB 27-0.8 MG 1 TABLET: 27-0.8 TAB at 08:02

## 2022-02-24 RX ADMIN — CLONAZEPAM 0.5 MG: 0.5 TABLET ORAL at 08:02

## 2022-02-24 RX ADMIN — BUPROPION HYDROCHLORIDE 300 MG: 150 TABLET, FILM COATED, EXTENDED RELEASE ORAL at 08:02

## 2022-02-24 RX ADMIN — IBUPROFEN 600 MG: 600 TABLET ORAL at 12:02

## 2022-02-24 NOTE — CARE UPDATE
In patient's room for OB evaluation, patient not present. Patient most likely in NICU. Will return for evaluation.

## 2022-02-24 NOTE — PROGRESS NOTES
POSTPARTUM PROGRESS NOTE    Subjective:     PPD/POD#: 2   Procedure:    EGA: 36w1d   N/V: No   F/C: No   Abd Pain: Mild, well-controlled with oral pain medication   Lochia: Mild   Voiding: Yes   Ambulating: Yes   Bowel fnc: Yes   Breastfeeding: Yes - pumping for baby in NICU   Contraception: Per primary OB   Circumcision: N/A, female - baby in NICU   Patient denies HA/RUQ pain/SOB/CP/visual disturbance.     Objective:      Temp:  [97.7 °F (36.5 °C)-98.3 °F (36.8 °C)] 98.3 °F (36.8 °C)  Pulse:  [] 96  Resp:  [16-18] 18  SpO2:  [94 %-99 %] 97 %  BP: (124-147)/(76-91) 140/87    Lung: Normal respiratory effort   Abdomen: Soft, appropriately tender   Uterus: Firm, no fundal tenderness. Fundus below umbilicus.    Incision: N/A   : Deferred   Extremities: Bilateral trace edema. 2+ DTRs.     Lab Review    Recent Labs   Lab 22  1215      K 4.2      CO2 23   BUN 9   CREATININE 0.7   GLU 74   PROT 6.6   BILITOT 0.3   ALKPHOS 164*   ALT 14   AST 20       Recent Labs   Lab 22  1215 22  0548   WBC 9.17 17.36*   HGB 12.1 10.8*   HCT 36.9* 32.4*   MCV 90 90    221         I/O  No intake or output data in the 24 hours ending 22 1200     Assessment and Plan:   Postpartum care:  - Patient doing well.  - Continue routine management and advances.  - Baby in NICU improving/doing well.   - Meeting milestones. DC home today. Rx sent. FU in 1 week for BP/mood check then 6 weeks for PP visit.     Mood Disorder  - Mood currently stable, appropriately handling baby in NICU  - Medications: continue home regimen of Lexapro, Wellbutrin, and Klonopin   - Will need 1-2 week postpartum mood check    PreE w/o SF  - BP as above  - asymptomatic. PE unremarkable.  - preE labs as above  - Mag: not indicated  - Hypertensive agent not indicated at this time.   - FU in 1 week for BP check. PreE precautions discussed.     Meghan Collins

## 2022-02-24 NOTE — PLAN OF CARE
VSS. Pt ambulating, voiding, and passing flatus. Pain controlled throughout shift with PO medications. Pumping often. Plan of care reviewed with pt at the beginning of the shift. Pt v/u of plan of care; questions answered.

## 2022-02-24 NOTE — DISCHARGE SUMMARY
Delivery Discharge Summary  Obstetrics      Primary OB Clinician: Dr. Adenike Mora    Admission date: 2022  Discharge date: 2022    Disposition: To home, self care    Admit Dx:      Patient Active Problem List   Diagnosis    Oligomenorrhea    Gestational hypertension affecting first pregnancy    Vaginal delivery    Retained products of conception after miscarriage    Bicornuate uterus    Bicornuate uterus affecting pregnancy, antepartum    Antepartum multigravida of advanced maternal age    History of gestational hypertension    Encounter for induction of labor    Category II fetal heart rate tracing during maternal care in third trimester     Discharge Dx:    Patient Active Problem List   Diagnosis    Oligomenorrhea    Gestational hypertension affecting first pregnancy    Vaginal delivery    Retained products of conception after miscarriage    Bicornuate uterus    Bicornuate uterus affecting pregnancy, antepartum    Antepartum multigravida of advanced maternal age    History of gestational hypertension    Encounter for induction of labor    Category II fetal heart rate tracing during maternal care in third trimester       Procedure:     Hospital Course:  Mable Philip is a 36 y.o. now , PPD #2 who was admitted on 2022 at 36.1 wga for induction of labor for preEclampsia without severe features in the setting of a Category 2 fetal heart tracing. On initial assessment, blood pressures were mild range otherwise vital signs were stable and physical exam was normal. Infant was in cephalic presentation. Patient was subsequently admitted to labor and delivery unit with signed consents. Labor course was managed with cruz balloon/Pitocin.  Patient delivered a single viable  female. Please see delivery note for further details. Pt was in stable condition post delivery and was transferred to the Mother-Baby Unit. Her postpartum course was complicated  PreEclampsia without severe features and patient remained stable requiring no medication. On discharge day, patient's pain is controlled with oral pain medications. Pt is tolerating ambulation without SOB or CP, and PO diet without N/V. Reports lochia is mild. Denies any HA, vision changes, F/C, LE swelling. Denies any breast pain/soreness.  Pt in stable condition and ready for discharge. She has been instructed to continue as indicated as well as pain medications as needed and to follow up in the OB clinic in 1 week for BP/mood check then 6 weeks with her obstetrics provider.    Pertinent studies:  Postpartum CBC  Lab Results   Component Value Date    WBC 17.36 (H) 02/23/2022    HGB 10.8 (L) 02/23/2022    HCT 32.4 (L) 02/23/2022    MCV 90 02/23/2022     02/23/2022         Tubal Ligation: none  Feeding Method: breast  Rh Immune Globulin Given(O POS): N/A      Delivery:    Episiotomy: None   Lacerations: 2nd   Repair suture:     Repair # of packets: 1   Blood loss (ml):       Birth information:  YOB: 2024   Time of birth: 4:31 PM   Sex: female   Delivery type: Vaginal, Spontaneous   Gestational Age: 36w1d    Delivery Clinician:      Other providers:       Additional  information:  Forceps:    Vacuum:    Breech:    Observed anomalies      Living?:           APGARS  One minute Five minutes Ten minutes   Skin color:         Heart rate:         Grimace:         Muscle tone:         Breathing:         Totals: 8  9        Placenta: Delivered:       appearance      Patient Instructions:   Current Discharge Medication List      START taking these medications    Details   buPROPion (WELLBUTRIN XL) 300 MG 24 hr tablet Take 1 tablet (300 mg total) by mouth once daily.  Qty: 30 tablet, Refills: 1      clonazePAM (KLONOPIN) 0.5 MG tablet Take 1 tablet (0.5 mg total) by mouth once daily.  Qty: 30 tablet, Refills: 1      docusate sodium (COLACE) 100 MG capsule Take 1 capsule (100 mg total) by mouth 2 (two)  times daily as needed for Constipation.  Qty: 60 capsule, Refills: 1      HYDROcodone-acetaminophen (NORCO) 5-325 mg per tablet Take 1 tablet by mouth every 4 (four) hours as needed for Pain (7-10.).  Qty: 30 tablet, Refills: 0    Comments: Quantity prescribed more than 7 day supply? No      ibuprofen (ADVIL,MOTRIN) 600 MG tablet Take 1 tablet (600 mg total) by mouth every 6 (six) hours as needed for Pain (1-6.).  Qty: 30 tablet, Refills: 1         CONTINUE these medications which have NOT CHANGED    Details   EScitalopram oxalate (LEXAPRO) 10 MG tablet Take 10 mg by mouth once daily.      prenatal vit/iron fum/folic ac (PRENATAL 1+1 ORAL) Take by mouth.             Discharge Procedure Orders   BREAST PUMP FOR HOME USE     Order Specific Question Answer Comments   Type of pump: Electric    Weight: 82.6 kg (182 lb)    Length of need (1-99 months): 99

## 2022-02-24 NOTE — LACTATION NOTE
LC did DC teaching for NICU mother pumping for her baby. Mother has NICU Mother's Breastfeeding Guide. Reviewed how to work pump, how to keep track of pumpings, how to label nicu breastmilk, how to clean pump parts and bring milk to NICU even if it is only a drop of milk. NICU uses mother's milk for mouth care so even small amounts are ok to bring to NICU. Mother aware to pump 8 or more times a day for 15-20 minutes. Mother is aware of proper techniques for transporting her breastmilk and is aware of the written instructions in her Mother's NICU Breastfeeding Guide. Mother is using a Symphony pump at home and is aware that she can use the Symphony breastpump at the baby's bedside when she visits. Mother has the MBU phone number and the NICU  phone number to call for further questions.

## 2022-03-02 ENCOUNTER — POSTPARTUM VISIT (OUTPATIENT)
Dept: OBSTETRICS AND GYNECOLOGY | Facility: CLINIC | Age: 37
End: 2022-03-02
Payer: COMMERCIAL

## 2022-03-02 ENCOUNTER — PATIENT MESSAGE (OUTPATIENT)
Dept: OBSTETRICS AND GYNECOLOGY | Facility: CLINIC | Age: 37
End: 2022-03-02

## 2022-03-02 ENCOUNTER — TELEPHONE (OUTPATIENT)
Dept: PHARMACY | Facility: CLINIC | Age: 37
End: 2022-03-02
Payer: COMMERCIAL

## 2022-03-02 VITALS
HEIGHT: 65 IN | SYSTOLIC BLOOD PRESSURE: 138 MMHG | WEIGHT: 161.38 LBS | BODY MASS INDEX: 26.89 KG/M2 | DIASTOLIC BLOOD PRESSURE: 80 MMHG

## 2022-03-02 DIAGNOSIS — Z30.431 IUD CHECK UP: Primary | ICD-10-CM

## 2022-03-02 PROCEDURE — 99999 PR PBB SHADOW E&M-EST. PATIENT-LVL III: CPT | Mod: PBBFAC,,, | Performed by: OBSTETRICS & GYNECOLOGY

## 2022-03-02 PROCEDURE — 99213 PR OFFICE/OUTPT VISIT, EST, LEVL III, 20-29 MIN: ICD-10-PCS | Mod: 24,S$GLB,, | Performed by: OBSTETRICS & GYNECOLOGY

## 2022-03-02 PROCEDURE — 99999 PR PBB SHADOW E&M-EST. PATIENT-LVL III: ICD-10-PCS | Mod: PBBFAC,,, | Performed by: OBSTETRICS & GYNECOLOGY

## 2022-03-02 PROCEDURE — 99213 OFFICE O/P EST LOW 20 MIN: CPT | Mod: 24,S$GLB,, | Performed by: OBSTETRICS & GYNECOLOGY

## 2022-03-02 PROCEDURE — 1111F DSCHRG MED/CURRENT MED MERGE: CPT | Mod: CPTII,S$GLB,, | Performed by: OBSTETRICS & GYNECOLOGY

## 2022-03-02 PROCEDURE — 1111F PR DISCHARGE MEDS RECONCILED W/ CURRENT OUTPATIENT MED LIST: ICD-10-PCS | Mod: CPTII,S$GLB,, | Performed by: OBSTETRICS & GYNECOLOGY

## 2022-03-02 NOTE — PROGRESS NOTES
"Subjective:     Mable Philip is a 36 y.o.  who presents for a one week post partum mood check. Patient underwent induction of labor 1 week ago secondary to non-reassuring fetal status on  testing in the setting of pre-eclampsia. She did not require magnesium or BP meds. Baby was admitted to the NICU and is still there. This has had a profoundly detrimental effect on maternal mental health. Mable has a long standing h/o anxiety/depression symptoms and has been restarted on her Lexapro, Wellbutrin, and Klonopin since PPD1. Today PPD score is 14, but patient denies thoughts of self harm and feels able to take care of her first born when she is home with her.    Objective:     Vitals: /80   Ht 5' 5" (1.651 m)   Wt 73.2 kg (161 lb 6 oz)   LMP 2021   Breastfeeding No Comment: pumping- baby in NICU   BMI 26.85 kg/m²     General:  Alert and cooperative   Psych: Tearful mood, appropriate affect.       Assessment:     1. S/p vaginal delivery x 1 week  2. Anxiety/depression - not currently well controlled - pt is to continue meds and contact her therapist.   3. Pre-eclampsia - BP issues resolving.     Plan:     1. Pt to contact her therapist.   2. Continue checking BP with [employer] supplies.   3. Will call patient next week for mood check after baby discharged to home.  4. Pelvic rest for 6 weeks postpartum.    5. Gradually resume normal activities.  6. Return to clinic in 4 weeks for final post partum follow up.      "

## 2022-04-06 ENCOUNTER — POSTPARTUM VISIT (OUTPATIENT)
Dept: OBSTETRICS AND GYNECOLOGY | Facility: CLINIC | Age: 37
End: 2022-04-06
Payer: COMMERCIAL

## 2022-04-06 ENCOUNTER — TELEPHONE (OUTPATIENT)
Dept: OBSTETRICS AND GYNECOLOGY | Facility: CLINIC | Age: 37
End: 2022-04-06

## 2022-04-06 VITALS
DIASTOLIC BLOOD PRESSURE: 70 MMHG | WEIGHT: 154.63 LBS | SYSTOLIC BLOOD PRESSURE: 124 MMHG | BODY MASS INDEX: 25.76 KG/M2 | HEIGHT: 65 IN

## 2022-04-06 DIAGNOSIS — Z30.430 ENCOUNTER FOR IUD INSERTION: ICD-10-CM

## 2022-04-06 DIAGNOSIS — Z32.02 PREGNANCY EXAMINATION OR TEST, NEGATIVE RESULT: ICD-10-CM

## 2022-04-06 PROCEDURE — 58300 INSERTION OF INTRAUTERINE DEVICE: ICD-10-PCS | Mod: S$GLB,,, | Performed by: OBSTETRICS & GYNECOLOGY

## 2022-04-06 PROCEDURE — 58300 INSERT INTRAUTERINE DEVICE: CPT | Mod: S$GLB,,, | Performed by: OBSTETRICS & GYNECOLOGY

## 2022-04-06 PROCEDURE — 0503F POSTPARTUM CARE VISIT: CPT | Mod: CPTII,S$GLB,, | Performed by: OBSTETRICS & GYNECOLOGY

## 2022-04-06 PROCEDURE — 99999 PR PBB SHADOW E&M-EST. PATIENT-LVL III: CPT | Mod: PBBFAC,,, | Performed by: OBSTETRICS & GYNECOLOGY

## 2022-04-06 PROCEDURE — 99999 PR PBB SHADOW E&M-EST. PATIENT-LVL III: ICD-10-PCS | Mod: PBBFAC,,, | Performed by: OBSTETRICS & GYNECOLOGY

## 2022-04-06 PROCEDURE — 0503F PR POSTPARTUM CARE VISIT: ICD-10-PCS | Mod: CPTII,S$GLB,, | Performed by: OBSTETRICS & GYNECOLOGY

## 2022-04-06 NOTE — PROCEDURES
Insertion of Intrauterine Device    Date/Time: 4/6/2022 10:45 AM  Performed by: Adenike Mora MD  Authorized by: Adenike Mora MD     Consent:     Consent obtained:  Written    Consent given by:  Patient    Procedure risks and benefits discussed: yes      Patient questions answered: yes      Patient agrees, verbalizes understanding, and wants to proceed: yes    Procedure:     Pelvic exam performed: yes      Negative urine pregnancy test: yes      Cervix cleaned and prepped: yes      Speculum placed in vagina: yes      Tenaculum applied to cervix: yes      Uterus sounded: yes      Uterus sound depth (cm):  8    IUD inserted with no complications: yes      IUD type:  Mirena    Strings trimmed: yes    1 Intra Uterine Device levonorgestreL 20 mcg/24 hours (7 yrs) 52 mg       Post-procedure:     Patient tolerated procedure well: yes      Patient will follow up after next period: yes    Comments:      IUD placed under U/S guidance 2/2 patient in post partum period.

## 2022-04-06 NOTE — PROGRESS NOTES
"  Subjective:      Mable Philip is a 36 y.o.  who presents for a postpartum visit.  She is status post  vaginal delivery complicated by pre-eclampsia 6 weeks ago.  Her hospitalization was not complicated and she did not require BP meds or magnesium.  She is not breastfeeding.  She desires Mirena for contraception.  She feels like mood symptoms are much improved (PPD score has decreased from 14 to 7). She has been working with psychiatry to optimize medication doses.     Her last pap was NILM, HPV neg on 2019    Objective:     /70   Ht 5' 5" (1.651 m)   Wt 70.1 kg (154 lb 9.7 oz)   LMP 2021   Breastfeeding No   BMI 25.73 kg/m²     General: healthy, no distress  Abdomen: Normal, benign.  External Genitalia: normal, well-healed, without lesions or masses  Vagina: normal, well-healed, physiologic discharge, without lesions    Assessment:     Postpartum care and examination    Encounter for IUD insertion  -     Insertion of Intrauterine Device    Pregnancy examination or test, negative result  -     POCT Urine Pregnancy      Plan:     1. RTC in 6 weeks for string check and annual exam    "

## 2022-06-28 ENCOUNTER — OFFICE VISIT (OUTPATIENT)
Dept: OBSTETRICS AND GYNECOLOGY | Facility: CLINIC | Age: 37
End: 2022-06-28
Payer: COMMERCIAL

## 2022-06-28 VITALS
DIASTOLIC BLOOD PRESSURE: 68 MMHG | HEIGHT: 65 IN | WEIGHT: 148.13 LBS | BODY MASS INDEX: 24.68 KG/M2 | SYSTOLIC BLOOD PRESSURE: 116 MMHG

## 2022-06-28 DIAGNOSIS — Z12.4 PAP SMEAR FOR CERVICAL CANCER SCREENING: Primary | ICD-10-CM

## 2022-06-28 PROCEDURE — 87624 HPV HI-RISK TYP POOLED RSLT: CPT | Performed by: OBSTETRICS & GYNECOLOGY

## 2022-06-28 PROCEDURE — 1159F MED LIST DOCD IN RCRD: CPT | Mod: CPTII,S$GLB,, | Performed by: OBSTETRICS & GYNECOLOGY

## 2022-06-28 PROCEDURE — 88175 CYTOPATH C/V AUTO FLUID REDO: CPT | Performed by: OBSTETRICS & GYNECOLOGY

## 2022-06-28 PROCEDURE — 3074F SYST BP LT 130 MM HG: CPT | Mod: CPTII,S$GLB,, | Performed by: OBSTETRICS & GYNECOLOGY

## 2022-06-28 PROCEDURE — 99999 PR PBB SHADOW E&M-EST. PATIENT-LVL III: CPT | Mod: PBBFAC,,, | Performed by: OBSTETRICS & GYNECOLOGY

## 2022-06-28 PROCEDURE — 1160F PR REVIEW ALL MEDS BY PRESCRIBER/CLIN PHARMACIST DOCUMENTED: ICD-10-PCS | Mod: CPTII,S$GLB,, | Performed by: OBSTETRICS & GYNECOLOGY

## 2022-06-28 PROCEDURE — 3078F PR MOST RECENT DIASTOLIC BLOOD PRESSURE < 80 MM HG: ICD-10-PCS | Mod: CPTII,S$GLB,, | Performed by: OBSTETRICS & GYNECOLOGY

## 2022-06-28 PROCEDURE — 3008F BODY MASS INDEX DOCD: CPT | Mod: CPTII,S$GLB,, | Performed by: OBSTETRICS & GYNECOLOGY

## 2022-06-28 PROCEDURE — 3078F DIAST BP <80 MM HG: CPT | Mod: CPTII,S$GLB,, | Performed by: OBSTETRICS & GYNECOLOGY

## 2022-06-28 PROCEDURE — 3074F PR MOST RECENT SYSTOLIC BLOOD PRESSURE < 130 MM HG: ICD-10-PCS | Mod: CPTII,S$GLB,, | Performed by: OBSTETRICS & GYNECOLOGY

## 2022-06-28 PROCEDURE — 99395 PREV VISIT EST AGE 18-39: CPT | Mod: S$GLB,,, | Performed by: OBSTETRICS & GYNECOLOGY

## 2022-06-28 PROCEDURE — 1159F PR MEDICATION LIST DOCUMENTED IN MEDICAL RECORD: ICD-10-PCS | Mod: CPTII,S$GLB,, | Performed by: OBSTETRICS & GYNECOLOGY

## 2022-06-28 PROCEDURE — 99999 PR PBB SHADOW E&M-EST. PATIENT-LVL III: ICD-10-PCS | Mod: PBBFAC,,, | Performed by: OBSTETRICS & GYNECOLOGY

## 2022-06-28 PROCEDURE — 3008F PR BODY MASS INDEX (BMI) DOCUMENTED: ICD-10-PCS | Mod: CPTII,S$GLB,, | Performed by: OBSTETRICS & GYNECOLOGY

## 2022-06-28 PROCEDURE — 99395 PR PREVENTIVE VISIT,EST,18-39: ICD-10-PCS | Mod: S$GLB,,, | Performed by: OBSTETRICS & GYNECOLOGY

## 2022-06-28 PROCEDURE — 1160F RVW MEDS BY RX/DR IN RCRD: CPT | Mod: CPTII,S$GLB,, | Performed by: OBSTETRICS & GYNECOLOGY

## 2022-06-28 RX ORDER — ESCITALOPRAM OXALATE 20 MG/1
30 TABLET ORAL DAILY
COMMUNITY
Start: 2022-06-03

## 2022-06-28 RX ORDER — CLONAZEPAM 1 MG/1
1 TABLET ORAL 2 TIMES DAILY
COMMUNITY
Start: 2022-06-03

## 2022-06-28 NOTE — PROGRESS NOTES
"Chief Complaint: Well Woman Exam     HPI:      Mable Philip is a 36 y.o.  who presents today for well woman exam.  LMP: Patient's last menstrual period was 2022 (exact date).  No issues, problems, or complaints. Specifically, patient denies abnormal discharge, pelvic pain, urinary problems, or changes in appetite. Has had very light vaginal bleeding for the past 2 weeks - this is the 1st bleeding she's noticed since IUD placement. (Normal as the LNG leads to thinning of the endometrium).  Ms. Philip is currently sexually active with a single male partner. She is currently using Mirena inserted in 2022 for contraception. She declines STD screening today.    Previous Pap: NILM, HPV negative (2019)  T-C Score: 11.4%    Ms. Philip confirms that she wears her seatbelt when riding in the car and does text while driving.     OB History as of 2022        3    Para   2    Term           2    AB   1    Living   2       SAB   1    IAB        Ectopic        Multiple   0    Live Births   2                 ROS:     GENERAL: Feeling well overall.   SKIN: Denies rash or lesions.   HEENT: Denies headaches, or vision changes.   CARDIOVASCULAR: Denies palpitations or chest pain.   RESPIRATORY: Denies shortness of breath or dyspnea on exertion.  BREASTS: Denies lumps or nipple discharge.   ABDOMEN: Denies constipation, diarrhea, nausea, vomiting, change in appetite.  URINARY: Denies frequency, dysuria.  NEUROLOGIC: Denies syncope or weakness.   PSYCHIATRIC: Denies uncontrolled depression or anxiety.    Physical Exam:      PHYSICAL EXAM:  /68   Ht 5' 5" (1.651 m)   Wt 67.2 kg (148 lb 2.4 oz)   LMP 2022 (Exact Date)   Breastfeeding No   BMI 24.65 kg/m²   Body mass index is 24.65 kg/m².     APPEARANCE: Well nourished, well developed, in no acute distress.  PSYCH: Appropriate mood and affect.  SKIN: No acne or hirsutism  NECK: Neck symmetric without masses  NODES: No " inguinal, axillary, or supraclavicular lymph node enlargement  ABDOMEN: Soft.  No tenderness or masses.    CARDIOVASCULAR: No edema of peripheral extremities  BREASTS: Symmetrical, no visible skin lesions. No palpable masses. No nipple discharge bilaterally.  PELVIC: Normal external genitalia without lesions.  Normal hair distribution.  Adequate perineal body, normal urethral meatus.  Vagina moist and well rugated without lesions or discharge.  Cervix pink, without lesions, discharge or tenderness. IUD strings visible at cervical os.  No significant cystocele or rectocele.  Bimanual exam shows uterus to be normal size, regular, mobile and nontender.  Adnexa without masses or tenderness.      Assessment/Plan:     Pap smear for cervical cancer screening  -     Cancel: HPV High Risk Genotypes, PCR  -     Cancel: Liquid-Based Pap Smear, Screening    Other orders  -     HPV DNA, High Risk with Reflex to Genotype 16,18  -     Pap Smear, Thin Prep        Follow up in about 1 year (around 6/28/2023) for Annual Exam.    Counseling:     Patient was counseled today on current ASCCP pap guidelines, the recommendation for yearly physical exams, safe driving habits, breast self awareness. She is to see her PCP for other health maintenance.     Use of the [employer] Patient Portal discussed and encouraged during today's visit.

## 2022-07-05 LAB
CLINICAL INFO: NORMAL
CYTO CVX: NORMAL
CYTOLOGIST CVX/VAG CYTO: NORMAL
CYTOLOGIST CVX/VAG CYTO: NORMAL
CYTOLOGY CMNT CVX/VAG CYTO-IMP: NORMAL
CYTOLOGY PAP THIN PREP EXPLANATION: NORMAL
DATE OF PREVIOUS PAP: NO
DATE PREVIOUS BX: NO
GEN CATEG CVX/VAG CYTO-IMP: NORMAL
HPV I/H RISK 4 DNA CVX QL NAA+PROBE: NOT DETECTED
LMP START DATE: NORMAL
MICROORGANISM CVX/VAG CYTO: NORMAL
PATHOLOGIST CVX/VAG CYTO: NORMAL
SERVICE CMNT-IMP: NORMAL
SPECIMEN SOURCE CVX/VAG CYTO: NORMAL
STAT OF ADQ CVX/VAG CYTO-IMP: NORMAL

## 2024-07-06 NOTE — LACTATION NOTE
03/12/18 0930   Maternal Infant Assessment   Breast Density Bilateral:;soft   Areola Bilateral:;elastic   Nipple(s) Bilateral:;everted;graspable   Nipple Symptoms bilateral:;redness;tender   Infant Assessment   Sucking Reflex present   Rooting Reflex present   Swallow Reflex present   LATCH Score   Latch 2-->grasps breast, tongue down, lips flanged, rhythmic sucking   Audible Swallowing 1-->a few with stimulation   Type Of Nipple 2-->everted (after stimulation)   Comfort (Breast/Nipple) 1-->filling, red/small blisters/bruises, mild/mod discomfort   Hold (Positioning) 1-->minimal assist, teach one side: mother does other, staff holds   Score (less than 7 for 2/more consecutive times, consult Lactation Consultant) 7   Pain/Comfort Assessments   Pain Assessment Performed Yes       Number Scale   Presence of Pain complains of pain/discomfort   Location nipple(s)   Pain Rating: Activity 2  (with latch assistance)   Maternal Infant Feeding   Maternal Emotional State relaxed;assist needed   Infant Positioning cross-cradle  (L & R)   Signs of Milk Transfer infant jaw motion present   Time Spent (min) 30-60 min   Latch Assistance yes  (moderate to achieve deep latch c mouth open wide)   Feeding Infant   Feeding Readiness Cues hand to mouth movements;eager;rooting;sucking motion present   Effective Latch During Feeding yes   Suck/Swallow Coordination present   Lactation Referrals   Lactation Consult Breastfeeding assessment;Knowledge deficit;Follow up   Lactation Referrals outpatient lactation program;pediatric care provider;support group   Lactation Interventions   Attachment Promotion breastfeeding assistance provided;counseling provided;privacy provided;rooming-in promoted;skin-to-skin contact encouraged;family involvement promoted   Breast Care: Breastfeeding milk massaged towards nipple;lanolin to nipple(s) applied  (reviewed use of lanolin pc)   Breastfeeding Assistance assisted with positioning;feeding cue  recognition promoted;feeding on demand promoted;feeding session observed;infant latch-on verified;infant suck/swallow verified;support offered   Maternal Breastfeeding Support diary/feeding log utilized;encouragement offered;lactation counseling provided;maternal hydration promoted;maternal nutrition promoted;maternal rest encouraged   Latch Promotion positioning assisted;suck stimulated with colostrum drop;infant moved to breast      Standing/Walking

## (undated) DEVICE — SEAL LENS SCOPE MYOSURE

## (undated) DEVICE — Device

## (undated) DEVICE — DEVICE MYOSURE LITE TISS REM

## (undated) DEVICE — GLOVE BIOGEL SKINSENSE PI 6.0

## (undated) DEVICE — PACK FLUENT DISPOSABLE

## (undated) DEVICE — SOL IRR NACL .9% 3000ML

## (undated) DEVICE — SOL 9P NACL IRR PIC IL

## (undated) DEVICE — SET BASIN 48X48IN 6000ML RING

## (undated) DEVICE — PAD PERI POST REPLACEMNT